# Patient Record
Sex: FEMALE
[De-identification: names, ages, dates, MRNs, and addresses within clinical notes are randomized per-mention and may not be internally consistent; named-entity substitution may affect disease eponyms.]

---

## 2021-07-08 PROBLEM — Z00.00 ENCOUNTER FOR PREVENTIVE HEALTH EXAMINATION: Status: ACTIVE | Noted: 2021-07-08

## 2021-07-13 ENCOUNTER — APPOINTMENT (OUTPATIENT)
Dept: PULMONOLOGY | Facility: CLINIC | Age: 46
End: 2021-07-13
Payer: COMMERCIAL

## 2021-07-13 ENCOUNTER — TRANSCRIPTION ENCOUNTER (OUTPATIENT)
Age: 46
End: 2021-07-13

## 2021-07-13 VITALS
DIASTOLIC BLOOD PRESSURE: 66 MMHG | OXYGEN SATURATION: 96 % | WEIGHT: 225 LBS | HEART RATE: 78 BPM | HEIGHT: 66 IN | RESPIRATION RATE: 15 BRPM | SYSTOLIC BLOOD PRESSURE: 104 MMHG | BODY MASS INDEX: 36.16 KG/M2 | TEMPERATURE: 98 F

## 2021-07-13 PROCEDURE — 99214 OFFICE O/P EST MOD 30 MIN: CPT | Mod: 25

## 2021-07-13 PROCEDURE — 99072 ADDL SUPL MATRL&STAF TM PHE: CPT

## 2021-07-13 PROCEDURE — 94729 DIFFUSING CAPACITY: CPT

## 2021-07-13 PROCEDURE — 94726 PLETHYSMOGRAPHY LUNG VOLUMES: CPT

## 2021-07-13 PROCEDURE — 94060 EVALUATION OF WHEEZING: CPT

## 2021-07-13 RX ORDER — ATORVASTATIN CALCIUM 20 MG/1
20 TABLET, FILM COATED ORAL
Qty: 30 | Refills: 0 | Status: ACTIVE | COMMUNITY
Start: 2021-06-24

## 2021-07-13 RX ORDER — AMOXICILLIN 500 MG/1
500 CAPSULE ORAL
Qty: 21 | Refills: 0 | Status: ACTIVE | COMMUNITY
Start: 2021-03-17

## 2021-07-13 RX ORDER — LEVOTHYROXINE SODIUM 25 UG/1
25 CAPSULE ORAL
Qty: 30 | Refills: 0 | Status: ACTIVE | COMMUNITY
Start: 2021-06-24

## 2021-07-13 RX ORDER — LEVOFLOXACIN 500 MG/1
500 TABLET, FILM COATED ORAL
Qty: 7 | Refills: 0 | Status: ACTIVE | COMMUNITY
Start: 2021-07-03

## 2021-07-13 NOTE — REASON FOR VISIT
[Follow-Up - From Hospitalization] : a follow-up visit after a recent hospitalization [TextBox_44] : Pleural  effusion

## 2021-07-13 NOTE — ASSESSMENT
[FreeTextEntry1] : In summary the patient is a 46-year-old obese female who is status post thoracentesis of right pleural effusion and who presents today for follow-up.  Patient's physical exam is significant for improved air entry in the right lung.  I reviewed the patient's pathology with her.  I recommend rheumatological evaluation in view of her positive DEREK in speckled pattern.  The patient underwent a pulmonary function test which revealed restrictive lung disease but excellent response post bronchodilator therapy.  The patient is now started on Trelegy.  A home sleep monitor has been ordered and the patient is instructed to follow-up in 2 weeks

## 2021-07-13 NOTE — HISTORY OF PRESENT ILLNESS
[Never] : never [TextBox_4] : Patient is a 46-year-old morbidly obese female who originally was admitted to the hospital with a right-sided pleural effusion and underwent thoracentesis.  Approximately 1 L of serous fluid was removed which was transudate of in nature.  The patient's rheumatology panel came back DEREK positive speckled pattern positive\par Patient currently denies fevers chills chest pain weight loss or hemoptysis.  She feels much better since her thoracentesis she does complain of occasional nonrestorative sleep

## 2021-07-21 ENCOUNTER — APPOINTMENT (OUTPATIENT)
Dept: PULMONOLOGY | Facility: CLINIC | Age: 46
End: 2021-07-21

## 2021-08-03 ENCOUNTER — APPOINTMENT (OUTPATIENT)
Dept: PULMONOLOGY | Facility: CLINIC | Age: 46
End: 2021-08-03
Payer: COMMERCIAL

## 2021-08-03 VITALS
WEIGHT: 223 LBS | TEMPERATURE: 98.3 F | BODY MASS INDEX: 35.84 KG/M2 | HEIGHT: 66 IN | OXYGEN SATURATION: 97 % | RESPIRATION RATE: 14 BRPM | HEART RATE: 90 BPM | DIASTOLIC BLOOD PRESSURE: 69 MMHG | SYSTOLIC BLOOD PRESSURE: 117 MMHG

## 2021-08-03 PROCEDURE — 99214 OFFICE O/P EST MOD 30 MIN: CPT

## 2021-08-03 NOTE — ASSESSMENT
[FreeTextEntry1] : In summary patient is a 46-year-old obese female with past medical history for possible rheumatoid arthritis and pleural effusion who presents for follow-up.  Patient's physical exam is within normal limits.  A CT of the chest has been ordered as well as an auto CPAP and the patient is instructed to follow-up in 3 months

## 2021-08-03 NOTE — HISTORY OF PRESENT ILLNESS
[Never] : never [TextBox_4] : Patient is a 46-year-old obese female with past medical history significant for high cholesterol who recently underwent a right thoracentesis and is currently being worked up for possible rheumatoid arthritis.  The patient also underwent a home sleep monitor which revealed moderate obstructive sleep apnea.  She presents today for further management

## 2021-10-19 ENCOUNTER — APPOINTMENT (OUTPATIENT)
Dept: PULMONOLOGY | Facility: CLINIC | Age: 46
End: 2021-10-19
Payer: COMMERCIAL

## 2021-10-19 VITALS
HEART RATE: 91 BPM | DIASTOLIC BLOOD PRESSURE: 81 MMHG | OXYGEN SATURATION: 96 % | RESPIRATION RATE: 14 BRPM | SYSTOLIC BLOOD PRESSURE: 138 MMHG | TEMPERATURE: 98.1 F

## 2021-10-19 PROCEDURE — 99214 OFFICE O/P EST MOD 30 MIN: CPT

## 2021-10-20 NOTE — PHYSICAL EXAM
[No Acute Distress] : no acute distress [Normal Oropharynx] : normal oropharynx [III] : Mallampati Class: III [Normal Appearance] : normal appearance [No Neck Mass] : no neck mass [Normal Rate/Rhythm] : normal rate/rhythm [Normal S1, S2] : normal s1, s2 [No Murmurs] : no murmurs [No Resp Distress] : no resp distress [No Abnormalities] : no abnormalities [Benign] : benign [Normal Gait] : normal gait [No Clubbing] : no clubbing [No Cyanosis] : no cyanosis [No Edema] : no edema [FROM] : FROM [Normal Color/ Pigmentation] : normal color/ pigmentation [No Focal Deficits] : no focal deficits [Oriented x3] : oriented x3 [Normal Affect] : normal affect [TextBox_68] : Decreased breath sounds right lower lobe

## 2021-10-20 NOTE — HISTORY OF PRESENT ILLNESS
[Never] : never [TextBox_4] : Patient is a 46-year-old obese female with past medical history significant for positive DEREK, moderate obstructive sleep apnea status post right thoracentesis with transudate effusion on July 4, 2021 who presents today for follow-up.  The patient had a repeat CT chest which revealed persistent pleural effusion despite thoracentesis.  Patient complains of occasional cough and shortness of breath.  Of note the patient's brother also has a history of pleural effusion status post VATS and pleural biopsy.  The patient is concerned regarding the persistence of her effusion and presents for further management.  She currently denies fevers chills chest pain weight loss or hemoptysis

## 2021-10-20 NOTE — ASSESSMENT
[FreeTextEntry1] : Summary the patient is a 46-year-old obese female with past medical history significant for obstructive sleep apnea currently not on CPAP, high cholesterol, DEREK positivity who presents today for evaluation of recurrent pleural effusion.  The patient's physical exam is significant for decreased breath sounds right lower lobe.  I reviewed the CAT scan of the chest with the patient.  We discussed options for further diagnosis and treatment.  The patient is in agreement to be seen by thoracic surgery for VATS and pleural biopsy.  The patient is now referred to Dr. Sears for further evaluation

## 2021-10-20 NOTE — REASON FOR VISIT
[Follow-Up] : a follow-up visit [Sleep Apnea] : sleep apnea [Shortness of Breath] : shortness of breath [TextBox_44] : Pleural effusion

## 2021-10-28 ENCOUNTER — APPOINTMENT (OUTPATIENT)
Dept: THORACIC SURGERY | Facility: CLINIC | Age: 46
End: 2021-10-28
Payer: COMMERCIAL

## 2021-10-28 VITALS
SYSTOLIC BLOOD PRESSURE: 116 MMHG | TEMPERATURE: 96.9 F | HEIGHT: 66 IN | OXYGEN SATURATION: 97 % | RESPIRATION RATE: 16 BRPM | WEIGHT: 230 LBS | DIASTOLIC BLOOD PRESSURE: 60 MMHG | BODY MASS INDEX: 36.96 KG/M2 | HEART RATE: 67 BPM

## 2021-10-28 DIAGNOSIS — R06.02 SHORTNESS OF BREATH: ICD-10-CM

## 2021-10-28 PROCEDURE — 99244 OFF/OP CNSLTJ NEW/EST MOD 40: CPT

## 2021-10-28 NOTE — HISTORY OF PRESENT ILLNESS
[FreeTextEntry1] : 47 y/o female, former smoker (quit 7/2021) with a PMH of hypothyroidism, HLD, positive DEERK, CHEVY,  with complaints of right neck pain and and subsequent CT chest revealing right pleural effusion, s/p right thoracentesis with transudate effusion on 7/4/2021 ( [facility]). Patient presents today for further diagnosis and treatment. Patient is referred by Dr. Wells. \par \par CT chest completed on 10/01/21:\par -slightly decreased right pleural effusion\par -improving right middle lobe and lower lobe atelectasis\par

## 2021-10-28 NOTE — ASSESSMENT
[FreeTextEntry1] : 45 y/o female, former smoker (quit 7/2021) with a PMH of hypothyroidism, HLD, positive DEREK, CHEVY,  with complaints of right neck pain and and subsequent CT chest revealing right pleural effusion, s/p right thoracentesis with transudate effusion on 7/4/2021 ( [facility]). Patient presents today for further diagnosis and treatment. Patient is referred by Dr. Wells. \par \par Today the patient reports SOB when taking a deep breath. She denies cough, fever and hemoptysis. \par \par CT chest completed on 10/01/21: was reviewed which revealed:\par -slightly decreased right pleural effusion\par -improving right middle lobe and lower lobe atelectasis\par \par Explained that we must determine the cause of the effusion. I recommend a Bronchoscopy, RIGHT Vats, robotic assisted, drainage of pleural effusion, possible decortication to determine a definitive diagnosis. WIll perform the decortication if the lung does not fully expand intraoperatively. Explained that this would be therapeutic and prevent the fluid from reaccumulation. Discussed that this could be related to infection vs. inflammation vs. malignancy.  \par \par She will need medical clearance, PST labs, UA EKG.\par \par The patient was counseled on the risks, benefits and alternatives of proceeding with drainage of pleural. effusion/ decortication. Specifically, the risk of bleeding, need for a blood transfusion, DVT, pulmonary embolism, pneumonia, postoperative respiratory insufficiency, postoperative ventilator support, as well as prolonged air leak, need for chest drainage, dysrhythmia, myocardial infarction, postoperative pain syndrome, need for adjuvant therapy, risk of recurrence, need for surveillance, conversion to an open procedure, as well as mortality was discussed with the patient who understands and agrees to the above. \par \par PLAN:\par 1. Bronchoscopy, RIGHT Vats, robotic assisted, drainage of pleural effusion, possible decortication on 11/10/21\par 2. medical clearance, PST labs, UA EKG.\par \par I, Patrick Sears, have reviewed the documentation recorded by the scribe in my presence and it accurately and completely records my words and actions.\par \par Recurrent symptomatic right pleural effusion referred for diagnosis and therapy. We discussed the possibility of need for decortication as well. Risks, benefits and alternatives were explained to the patient, and her son who was present at the patient's request. All questions were answered to the patient's satisfaction.\par

## 2021-10-28 NOTE — PHYSICAL EXAM
[General Appearance - Alert] : alert [General Appearance - In No Acute Distress] : in no acute distress [General Appearance - Well Nourished] : well nourished [Neck Appearance] : the appearance of the neck was normal [Neck Cervical Mass (___cm)] : no neck mass was observed [] : no respiratory distress [Respiration, Rhythm And Depth] : normal respiratory rhythm and effort [Exaggerated Use Of Accessory Muscles For Inspiration] : no accessory muscle use [Examination Of The Chest] : the chest was normal in appearance [Oriented To Time, Place, And Person] : oriented to person, place, and time [Impaired Insight] : insight and judgment were intact [Affect] : the affect was normal

## 2021-11-07 ENCOUNTER — LABORATORY RESULT (OUTPATIENT)
Age: 46
End: 2021-11-07

## 2021-11-07 ENCOUNTER — APPOINTMENT (OUTPATIENT)
Dept: DISASTER EMERGENCY | Facility: CLINIC | Age: 46
End: 2021-11-07

## 2021-11-07 DIAGNOSIS — Z01.818 ENCOUNTER FOR OTHER PREPROCEDURAL EXAMINATION: ICD-10-CM

## 2021-11-09 ENCOUNTER — NON-APPOINTMENT (OUTPATIENT)
Age: 46
End: 2021-11-09

## 2021-11-09 ENCOUNTER — TRANSCRIPTION ENCOUNTER (OUTPATIENT)
Age: 46
End: 2021-11-09

## 2021-11-09 VITALS
HEART RATE: 67 BPM | TEMPERATURE: 97 F | SYSTOLIC BLOOD PRESSURE: 116 MMHG | RESPIRATION RATE: 18 BRPM | DIASTOLIC BLOOD PRESSURE: 60 MMHG | OXYGEN SATURATION: 97 % | HEIGHT: 66 IN | WEIGHT: 229.94 LBS

## 2021-11-09 RX ORDER — INFLUENZA VIRUS VACCINE 15; 15; 15; 15 UG/.5ML; UG/.5ML; UG/.5ML; UG/.5ML
0.5 SUSPENSION INTRAMUSCULAR ONCE
Refills: 0 | Status: COMPLETED | OUTPATIENT
Start: 2021-11-10 | End: 2021-11-10

## 2021-11-09 NOTE — H&P ADULT - REASON FOR ADMISSION
Elective: Bronchoscopy, RIGHT VATS robotic assisted drainage of pleural effusion, possible decoration

## 2021-11-09 NOTE — H&P ADULT - NSHPSOCIALHISTORY_GEN_ALL_CORE
Denies smoking, alcohol or illicit drug use. Denies alcohol or illicit drug use.  Former smoker, quit in July, 1 pack a week x 20 years

## 2021-11-09 NOTE — H&P ADULT - HISTORY OF PRESENT ILLNESS
45 y/o female, former smoker (quit 7/2021) with a PMH of hypothyroidism, HLD, positive DEREK, CHEVY, with complaints of right neck pain and and subsequent CT chest revealing right pleural effusion, s/p right thoracentesis with transudate effusion on 7/4/2021 ( [facility]). Patient presents today for further diagnosis and treatment. Patient is referred by Dr. Wells.     CT chest completed on 10/01/21:  -slightly decreased right pleural effusion  -improving right middle lobe and lower lobe atelectasis   47 y/o female, former smoker (quit 7/2021) with a PMH of hypothyroidism, HLD, positive DEREK, CHEVY, with complaints of right neck pain and and subsequent CT chest revealing right pleural effusion, s/p right thoracentesis with transudate effusion on 7/4/2021 ( [facility]). Patient presents today for further diagnosis and treatment. Patient is referred by Dr. Wells.     CT chest completed on 10/01/21:  -slightly decreased right pleural effusion  -improving right middle lobe and lower lobe atelectasis    She presents today for R VATS RA drainage of pleural effusion and possible decortication. Patient seen in same day holding area; Reports no changes to PMHx or medications since last seen by our team. Denies acute or current SOB, chest pain, palpitation, N/V/D, fever/chills, recent illness, or any other concerning symptoms.

## 2021-11-09 NOTE — H&P ADULT - NSHPLABSRESULTS_GEN_ALL_CORE
CT chest completed on 10/01/21:  -slightly decreased right pleural effusion  -improving right middle lobe and lower lobe atelectasis

## 2021-11-09 NOTE — H&P ADULT - ASSESSMENT
47 y/o female, former smoker (quit 7/2021) with a PMH of hypothyroidism, HLD, positive DEREK, CHEVY, with complaints of right neck pain and and subsequent CT chest revealing right pleural effusion, s/p right thoracentesis with transudate effusion on 7/4/2021 ( [facility]). Patient presents today for further diagnosis and treatment. Patient is referred by Dr. Wells.     Today the patient reports SOB when taking a deep breath. She denies cough, fever and hemoptysis.     CT chest completed on 10/01/21: was reviewed which revealed:  -slightly decreased right pleural effusion  -improving right middle lobe and lower lobe atelectasis    Explained that we must determine the cause of the effusion. I recommend a Bronchoscopy, RIGHT Vats, robotic assisted, drainage of pleural effusion, possible decortication to determine a definitive diagnosis. WIll perform the decortication if the lung does not fully expand intraoperatively. Explained that this would be therapeutic and prevent the fluid from reaccumulation. Discussed that this could be related to infection vs. inflammation vs. malignancy.     She will need medical clearance, PST labs, UA EKG.    The patient was counseled on the risks, benefits and alternatives of proceeding with drainage of pleural. effusion/ decortication. Specifically, the risk of bleeding, need for a blood transfusion, DVT, pulmonary embolism, pneumonia, postoperative respiratory insufficiency, postoperative ventilator support, as well as prolonged air leak, need for chest drainage, dysrhythmia, myocardial infarction, postoperative pain syndrome, need for adjuvant therapy, risk of recurrence, need for surveillance, conversion to an open procedure, as well as mortality was discussed with the patient who understands and agrees to the above.     PLAN:  1. Bronchoscopy, RIGHT Vats, robotic assisted, drainage of pleural effusion, possible decortication on 11/10/21  2. Medical clearance, PST labs, UA EKG.   45 y/o female, former smoker (quit 7/2021) with a PMH of hypothyroidism, HLD, positive DEREK, CHEVY, with complaints of right neck pain and and subsequent CT chest revealing right pleural effusion, s/p right thoracentesis with transudate effusion on 7/4/2021 ( [facility]). Patient presents today for further diagnosis and treatment. Patient is referred by Dr. Wells.     Today the patient reports SOB when taking a deep breath. She denies cough, fever and hemoptysis.     CT chest completed on 10/01/21: was reviewed which revealed:  -slightly decreased right pleural effusion  -improving right middle lobe and lower lobe atelectasis    Explained that we must determine the cause of the effusion. I recommend a Bronchoscopy, RIGHT Vats, robotic assisted, drainage of pleural effusion, possible decortication to determine a definitive diagnosis. WIll perform the decortication if the lung does not fully expand intraoperatively. Explained that this would be therapeutic and prevent the fluid from reaccumulation. Discussed that this could be related to infection vs. inflammation vs. malignancy.     She will need medical clearance, PST labs, UA EKG.    The patient was counseled on the risks, benefits and alternatives of proceeding with drainage of pleural. effusion/ decortication. Specifically, the risk of bleeding, need for a blood transfusion, DVT, pulmonary embolism, pneumonia, postoperative respiratory insufficiency, postoperative ventilator support, as well as prolonged air leak, need for chest drainage, dysrhythmia, myocardial infarction, postoperative pain syndrome, need for adjuvant therapy, risk of recurrence, need for surveillance, conversion to an open procedure, as well as mortality was discussed with the patient who understands and agrees to the above.     PLAN:  1. Bronchoscopy, RIGHT Vats, robotic assisted, drainage of pleural effusion, possible decortication on 11/10/21  2. Medical clearance, PST labs, UA EKG.      Admit under Dr. Sears via same day surgery.   Consent signed, placed on chart.  Risks/benefits reviewed, patient understands and agrees.   T&S ordered and blood products placed on hold for OR.    To [facility]  post-op.

## 2021-11-09 NOTE — H&P ADULT - BMI (KG/M2)
37.1 MD acknowledged the results and states that she will come to assess the patient will reassess zoran zheng Provider notified and aware. hold pm dosage. do another vanco trough before 6am dose of vanco Pending orders Will assess patient during morning rounds, hold vancomycin until dosage review

## 2021-11-10 ENCOUNTER — INPATIENT (INPATIENT)
Facility: HOSPITAL | Age: 46
LOS: 2 days | Discharge: HOME | DRG: 164 | End: 2021-11-13
Attending: SPECIALIST | Admitting: SPECIALIST
Payer: COMMERCIAL

## 2021-11-10 ENCOUNTER — RESULT REVIEW (OUTPATIENT)
Age: 46
End: 2021-11-10

## 2021-11-10 ENCOUNTER — APPOINTMENT (OUTPATIENT)
Dept: THORACIC SURGERY | Facility: HOSPITAL | Age: 46
End: 2021-11-10

## 2021-11-10 DIAGNOSIS — Z90.89 ACQUIRED ABSENCE OF OTHER ORGANS: Chronic | ICD-10-CM

## 2021-11-10 DIAGNOSIS — Z98.890 OTHER SPECIFIED POSTPROCEDURAL STATES: Chronic | ICD-10-CM

## 2021-11-10 DIAGNOSIS — Z90.49 ACQUIRED ABSENCE OF OTHER SPECIFIED PARTS OF DIGESTIVE TRACT: Chronic | ICD-10-CM

## 2021-11-10 DIAGNOSIS — M51.26 OTHER INTERVERTEBRAL DISC DISPLACEMENT, LUMBAR REGION: Chronic | ICD-10-CM

## 2021-11-10 LAB
ANION GAP SERPL CALC-SCNC: 12 MMOL/L — SIGNIFICANT CHANGE UP (ref 5–17)
B PERT IGG+IGM PNL SER: SIGNIFICANT CHANGE UP
BASOPHILS # BLD AUTO: 0.06 K/UL — SIGNIFICANT CHANGE UP (ref 0–0.2)
BASOPHILS NFR BLD AUTO: 0.5 % — SIGNIFICANT CHANGE UP (ref 0–2)
BLD GP AB SCN SERPL QL: NEGATIVE — SIGNIFICANT CHANGE UP
BUN SERPL-MCNC: 13 MG/DL — SIGNIFICANT CHANGE UP (ref 7–23)
CALCIUM SERPL-MCNC: 9.1 MG/DL — SIGNIFICANT CHANGE UP (ref 8.4–10.5)
CHLORIDE SERPL-SCNC: 104 MMOL/L — SIGNIFICANT CHANGE UP (ref 96–108)
CO2 SERPL-SCNC: 23 MMOL/L — SIGNIFICANT CHANGE UP (ref 22–31)
COLOR FLD: YELLOW — SIGNIFICANT CHANGE UP
CREAT SERPL-MCNC: 0.74 MG/DL — SIGNIFICANT CHANGE UP (ref 0.5–1.3)
EOSINOPHIL # BLD AUTO: 0.02 K/UL — SIGNIFICANT CHANGE UP (ref 0–0.5)
EOSINOPHIL NFR BLD AUTO: 0.2 % — SIGNIFICANT CHANGE UP (ref 0–6)
FLUID INTAKE SUBSTANCE CLASS: SIGNIFICANT CHANGE UP
GLUCOSE BLDC GLUCOMTR-MCNC: 114 MG/DL — HIGH (ref 70–99)
GLUCOSE BLDC GLUCOMTR-MCNC: 144 MG/DL — HIGH (ref 70–99)
GLUCOSE FLD-MCNC: 96 MG/DL — SIGNIFICANT CHANGE UP
GLUCOSE SERPL-MCNC: 125 MG/DL — HIGH (ref 70–99)
GRAM STN FLD: SIGNIFICANT CHANGE UP
GRAM STN FLD: SIGNIFICANT CHANGE UP
HCT VFR BLD CALC: 40.7 % — SIGNIFICANT CHANGE UP (ref 34.5–45)
HGB BLD-MCNC: 13.9 G/DL — SIGNIFICANT CHANGE UP (ref 11.5–15.5)
IMM GRANULOCYTES NFR BLD AUTO: 0.6 % — SIGNIFICANT CHANGE UP (ref 0–1.5)
LDH SERPL L TO P-CCNC: 174 U/L — SIGNIFICANT CHANGE UP
LYMPHOCYTES # BLD AUTO: 1.01 K/UL — SIGNIFICANT CHANGE UP (ref 1–3.3)
LYMPHOCYTES # BLD AUTO: 7.7 % — LOW (ref 13–44)
MCHC RBC-ENTMCNC: 29.4 PG — SIGNIFICANT CHANGE UP (ref 27–34)
MCHC RBC-ENTMCNC: 34.2 GM/DL — SIGNIFICANT CHANGE UP (ref 32–36)
MCV RBC AUTO: 86 FL — SIGNIFICANT CHANGE UP (ref 80–100)
MONOCYTES # BLD AUTO: 0.34 K/UL — SIGNIFICANT CHANGE UP (ref 0–0.9)
MONOCYTES NFR BLD AUTO: 2.6 % — SIGNIFICANT CHANGE UP (ref 2–14)
NEUTROPHILS # BLD AUTO: 11.57 K/UL — HIGH (ref 1.8–7.4)
NEUTROPHILS NFR BLD AUTO: 88.4 % — HIGH (ref 43–77)
NRBC # BLD: 0 /100 WBCS — SIGNIFICANT CHANGE UP (ref 0–0)
PLATELET # BLD AUTO: 277 K/UL — SIGNIFICANT CHANGE UP (ref 150–400)
POTASSIUM SERPL-MCNC: 4.4 MMOL/L — SIGNIFICANT CHANGE UP (ref 3.5–5.3)
POTASSIUM SERPL-SCNC: 4.4 MMOL/L — SIGNIFICANT CHANGE UP (ref 3.5–5.3)
PROT FLD-MCNC: 4.8 G/DL — SIGNIFICANT CHANGE UP
RBC # BLD: 4.73 M/UL — SIGNIFICANT CHANGE UP (ref 3.8–5.2)
RBC # FLD: 12.3 % — SIGNIFICANT CHANGE UP (ref 10.3–14.5)
RCV VOL RI: 1000 /UL — HIGH (ref 0–0)
RH IG SCN BLD-IMP: POSITIVE — SIGNIFICANT CHANGE UP
SODIUM SERPL-SCNC: 139 MMOL/L — SIGNIFICANT CHANGE UP (ref 135–145)
SPECIMEN SOURCE FLD: SIGNIFICANT CHANGE UP
SPECIMEN SOURCE: SIGNIFICANT CHANGE UP
SPECIMEN SOURCE: SIGNIFICANT CHANGE UP
TOTAL NUCLEATED CELL COUNT, BODY FLUID: 1236 /UL — SIGNIFICANT CHANGE UP
TUBE TYPE: SIGNIFICANT CHANGE UP
WBC # BLD: 13.08 K/UL — HIGH (ref 3.8–10.5)
WBC # FLD AUTO: 13.08 K/UL — HIGH (ref 3.8–10.5)

## 2021-11-10 PROCEDURE — 88305 TISSUE EXAM BY PATHOLOGIST: CPT | Mod: 26

## 2021-11-10 PROCEDURE — 71045 X-RAY EXAM CHEST 1 VIEW: CPT | Mod: 26

## 2021-11-10 PROCEDURE — 32652 THORACOSCOPY REM TOTL CORTEX: CPT | Mod: 80

## 2021-11-10 PROCEDURE — 32652 THORACOSCOPY REM TOTL CORTEX: CPT

## 2021-11-10 PROCEDURE — 88112 CYTOPATH CELL ENHANCE TECH: CPT | Mod: 26

## 2021-11-10 PROCEDURE — 31622 DX BRONCHOSCOPE/WASH: CPT

## 2021-11-10 PROCEDURE — S2900 ROBOTIC SURGICAL SYSTEM: CPT | Mod: NC

## 2021-11-10 RX ORDER — ATORVASTATIN CALCIUM 80 MG/1
1 TABLET, FILM COATED ORAL
Qty: 0 | Refills: 0 | DISCHARGE

## 2021-11-10 RX ORDER — DEXTROSE 50 % IN WATER 50 %
15 SYRINGE (ML) INTRAVENOUS ONCE
Refills: 0 | Status: DISCONTINUED | OUTPATIENT
Start: 2021-11-10 | End: 2021-11-11

## 2021-11-10 RX ORDER — INSULIN LISPRO 100/ML
VIAL (ML) SUBCUTANEOUS
Refills: 0 | Status: DISCONTINUED | OUTPATIENT
Start: 2021-11-10 | End: 2021-11-11

## 2021-11-10 RX ORDER — DEXTROSE 50 % IN WATER 50 %
25 SYRINGE (ML) INTRAVENOUS ONCE
Refills: 0 | Status: DISCONTINUED | OUTPATIENT
Start: 2021-11-10 | End: 2021-11-11

## 2021-11-10 RX ORDER — FAMOTIDINE 10 MG/ML
20 INJECTION INTRAVENOUS DAILY
Refills: 0 | Status: DISCONTINUED | OUTPATIENT
Start: 2021-11-10 | End: 2021-11-11

## 2021-11-10 RX ORDER — BUPIVACAINE 13.3 MG/ML
20 INJECTION, SUSPENSION, LIPOSOMAL INFILTRATION ONCE
Refills: 0 | Status: DISCONTINUED | OUTPATIENT
Start: 2021-11-10 | End: 2021-11-11

## 2021-11-10 RX ORDER — DEXTROSE 50 % IN WATER 50 %
12.5 SYRINGE (ML) INTRAVENOUS ONCE
Refills: 0 | Status: DISCONTINUED | OUTPATIENT
Start: 2021-11-10 | End: 2021-11-11

## 2021-11-10 RX ORDER — ACETAMINOPHEN 500 MG
1000 TABLET ORAL ONCE
Refills: 0 | Status: COMPLETED | OUTPATIENT
Start: 2021-11-10 | End: 2021-11-10

## 2021-11-10 RX ORDER — SODIUM CHLORIDE 9 MG/ML
1000 INJECTION, SOLUTION INTRAVENOUS
Refills: 0 | Status: DISCONTINUED | OUTPATIENT
Start: 2021-11-10 | End: 2021-11-11

## 2021-11-10 RX ORDER — GLUCAGON INJECTION, SOLUTION 0.5 MG/.1ML
1 INJECTION, SOLUTION SUBCUTANEOUS ONCE
Refills: 0 | Status: DISCONTINUED | OUTPATIENT
Start: 2021-11-10 | End: 2021-11-11

## 2021-11-10 RX ORDER — CEFAZOLIN SODIUM 1 G
2000 VIAL (EA) INJECTION EVERY 8 HOURS
Refills: 0 | Status: COMPLETED | OUTPATIENT
Start: 2021-11-10 | End: 2021-11-11

## 2021-11-10 RX ORDER — LEVOTHYROXINE SODIUM 125 MCG
25 TABLET ORAL DAILY
Refills: 0 | Status: DISCONTINUED | OUTPATIENT
Start: 2021-11-10 | End: 2021-11-13

## 2021-11-10 RX ORDER — LEVOTHYROXINE SODIUM 125 MCG
1 TABLET ORAL
Qty: 0 | Refills: 0 | DISCHARGE

## 2021-11-10 RX ORDER — ATORVASTATIN CALCIUM 80 MG/1
20 TABLET, FILM COATED ORAL AT BEDTIME
Refills: 0 | Status: DISCONTINUED | OUTPATIENT
Start: 2021-11-10 | End: 2021-11-13

## 2021-11-10 RX ORDER — MORPHINE SULFATE 50 MG/1
2 CAPSULE, EXTENDED RELEASE ORAL ONCE
Refills: 0 | Status: DISCONTINUED | OUTPATIENT
Start: 2021-11-10 | End: 2021-11-10

## 2021-11-10 RX ORDER — LIDOCAINE 4 G/100G
1 CREAM TOPICAL DAILY
Refills: 0 | Status: DISCONTINUED | OUTPATIENT
Start: 2021-11-10 | End: 2021-11-13

## 2021-11-10 RX ORDER — HEPARIN SODIUM 5000 [USP'U]/ML
7500 INJECTION INTRAVENOUS; SUBCUTANEOUS EVERY 8 HOURS
Refills: 0 | Status: DISCONTINUED | OUTPATIENT
Start: 2021-11-10 | End: 2021-11-13

## 2021-11-10 RX ADMIN — Medication 1000 MILLIGRAM(S): at 22:00

## 2021-11-10 RX ADMIN — MORPHINE SULFATE 2 MILLIGRAM(S): 50 CAPSULE, EXTENDED RELEASE ORAL at 19:00

## 2021-11-10 RX ADMIN — Medication 100 MILLIGRAM(S): at 21:19

## 2021-11-10 RX ADMIN — MORPHINE SULFATE 2 MILLIGRAM(S): 50 CAPSULE, EXTENDED RELEASE ORAL at 19:59

## 2021-11-10 RX ADMIN — LIDOCAINE 1 PATCH: 4 CREAM TOPICAL at 22:00

## 2021-11-10 RX ADMIN — ATORVASTATIN CALCIUM 20 MILLIGRAM(S): 80 TABLET, FILM COATED ORAL at 22:02

## 2021-11-10 RX ADMIN — HEPARIN SODIUM 7500 UNIT(S): 5000 INJECTION INTRAVENOUS; SUBCUTANEOUS at 22:56

## 2021-11-10 RX ADMIN — Medication 400 MILLIGRAM(S): at 21:18

## 2021-11-10 NOTE — BRIEF OPERATIVE NOTE - NSICDXBRIEFPOSTOP_GEN_ALL_CORE_FT
POST-OP DIAGNOSIS:  Pleural effusion, right 10-Nov-2021 16:08:26  Nneka Salomon  Trapped lung 10-Nov-2021 16:08:33  Nneka Salomon

## 2021-11-10 NOTE — BRIEF OPERATIVE NOTE - COMMENTS
Dr. King was the first assistant for this case including but not limited to right pleurodesis and evacuation of right pleural effusion     I was present for this procedure and participated as first assistant as described by the PA above, unless otherwise noted below.

## 2021-11-10 NOTE — PROGRESS NOTE ADULT - SUBJECTIVE AND OBJECTIVE BOX
Patient discussed on morning rounds with Dr. Sears    Operation / Date: 11/10/21 right VATs with total decortication and evacuation of pleural effusion    SUBJECTIVE ASSESSMENT:  Feeling well, pain well controlled. No complaints in PACU, denies n/v/d/c, fevers or chills.     Vital Signs Last 24 Hrs  T(C): --  T(F): --  HR: 88 (10 Nov 2021 16:10) (88 - 93)  BP: 130/61 (10 Nov 2021 16:10) (117/56 - 130/61)  BP(mean): 88 (10 Nov 2021 16:10) (81 - 88)  RR: 14 (10 Nov 2021 16:10) (12 - 17)  SpO2: 99% (10 Nov 2021 16:10) (98% - 99%)  I&O's Detail    CHEST TUBE: yes x2 -- on suction, no airleak, minimal output  REBECCA DRAIN:  no  EPICARDIAL WIRES: no  TIE DOWNS: no.  ESPINO: no    PHYSICAL EXAM:  General: well appearing sitting in bed in NAD   Neurological: AOx3. Motor skills grossly intact  Cardiovascular: Normal S1/S2. Regular rate/rhythm. No murmurs  Respiratory: Lungs CTA bilaterally. No wheezing or rales  Gastrointestinal: +BS in all 4 quadrants. Non-distended. Soft. Non-tender  Extremities: Strength 5/5 b/l upper/lower extremities. Sensation grossly intact upper/lower extremities. No edema. No calf tenderness.  Vascular: Radial 2+bilaterally, DP 2+ b/l  Incision Sites: VATS incisions without erythema, purulence or ecchymosis. CT in place with dressing c/d/i.     LABS:    COUMADIN: no    MEDICATIONS  (STANDING):  acetaminophen   IVPB .. 1000 milliGRAM(s) IV Intermittent once  atorvastatin 20 milliGRAM(s) Oral at bedtime  BUpivacaine liposome 1.3% Injectable (no eMAR) 20 milliLiter(s) Local Injection once  ceFAZolin   IVPB 2000 milliGRAM(s) IV Intermittent every 8 hours  famotidine Injectable 20 milliGRAM(s) IV Push daily  glucagon  Injectable 1 milliGRAM(s) IntraMuscular once  heparin   Injectable 5000 Unit(s) SubCutaneous every 8 hours  influenza   Vaccine 0.5 milliLiter(s) IntraMuscular once  insulin lispro (ADMELOG) corrective regimen sliding scale   SubCutaneous three times a day before meals  lactated ringers. 1000 milliLiter(s) (50 mL/Hr) IV Continuous <Continuous>  levothyroxine 25 MICROGram(s) Oral daily  lidocaine   4% Patch 1 Patch Transdermal daily    MEDICATIONS  (PRN):    RADIOLOGY & ADDITIONAL TESTS:  Post-op CXR: no acute pathology, no PTX, f/u official read

## 2021-11-11 LAB
A1C WITH ESTIMATED AVERAGE GLUCOSE RESULT: 5.3 % — SIGNIFICANT CHANGE UP (ref 4–5.6)
ANION GAP SERPL CALC-SCNC: 13 MMOL/L — SIGNIFICANT CHANGE UP (ref 5–17)
APTT BLD: 28.2 SEC — SIGNIFICANT CHANGE UP (ref 27.5–35.5)
BASOPHILS # BLD AUTO: 0.03 K/UL — SIGNIFICANT CHANGE UP (ref 0–0.2)
BASOPHILS NFR BLD AUTO: 0.3 % — SIGNIFICANT CHANGE UP (ref 0–2)
BUN SERPL-MCNC: 10 MG/DL — SIGNIFICANT CHANGE UP (ref 7–23)
CALCIUM SERPL-MCNC: 9.4 MG/DL — SIGNIFICANT CHANGE UP (ref 8.4–10.5)
CHLORIDE SERPL-SCNC: 102 MMOL/L — SIGNIFICANT CHANGE UP (ref 96–108)
CO2 SERPL-SCNC: 26 MMOL/L — SIGNIFICANT CHANGE UP (ref 22–31)
COMMENT - FLUIDS: SIGNIFICANT CHANGE UP
CREAT SERPL-MCNC: 0.7 MG/DL — SIGNIFICANT CHANGE UP (ref 0.5–1.3)
EOSINOPHIL # BLD AUTO: 0 K/UL — SIGNIFICANT CHANGE UP (ref 0–0.5)
EOSINOPHIL NFR BLD AUTO: 0 % — SIGNIFICANT CHANGE UP (ref 0–6)
ESTIMATED AVERAGE GLUCOSE: 105 MG/DL — SIGNIFICANT CHANGE UP (ref 68–114)
FLUID SEGMENTED GRANULOCYTES: 3 % — SIGNIFICANT CHANGE UP
GLUCOSE BLDC GLUCOMTR-MCNC: 108 MG/DL — HIGH (ref 70–99)
GLUCOSE BLDC GLUCOMTR-MCNC: 117 MG/DL — HIGH (ref 70–99)
GLUCOSE SERPL-MCNC: 104 MG/DL — HIGH (ref 70–99)
HCT VFR BLD CALC: 39.4 % — SIGNIFICANT CHANGE UP (ref 34.5–45)
HGB BLD-MCNC: 12.5 G/DL — SIGNIFICANT CHANGE UP (ref 11.5–15.5)
IMM GRANULOCYTES NFR BLD AUTO: 0.8 % — SIGNIFICANT CHANGE UP (ref 0–1.5)
INR BLD: 0.97 — SIGNIFICANT CHANGE UP (ref 0.88–1.16)
LYMPHOCYTES # BLD AUTO: 1.1 K/UL — SIGNIFICANT CHANGE UP (ref 1–3.3)
LYMPHOCYTES # BLD AUTO: 9.3 % — LOW (ref 13–44)
LYMPHOCYTES # FLD: 86 % — SIGNIFICANT CHANGE UP
MCHC RBC-ENTMCNC: 28.1 PG — SIGNIFICANT CHANGE UP (ref 27–34)
MCHC RBC-ENTMCNC: 31.7 GM/DL — LOW (ref 32–36)
MCV RBC AUTO: 88.5 FL — SIGNIFICANT CHANGE UP (ref 80–100)
MONOCYTES # BLD AUTO: 0.74 K/UL — SIGNIFICANT CHANGE UP (ref 0–0.9)
MONOCYTES NFR BLD AUTO: 6.3 % — SIGNIFICANT CHANGE UP (ref 2–14)
MONOS+MACROS # FLD: 11 % — SIGNIFICANT CHANGE UP
NEUTROPHILS # BLD AUTO: 9.84 K/UL — HIGH (ref 1.8–7.4)
NEUTROPHILS NFR BLD AUTO: 83.3 % — HIGH (ref 43–77)
NIGHT BLUE STAIN TISS: SIGNIFICANT CHANGE UP
NIGHT BLUE STAIN TISS: SIGNIFICANT CHANGE UP
NRBC # BLD: 0 /100 WBCS — SIGNIFICANT CHANGE UP (ref 0–0)
PH, PLEURAL FLUID: 7.15 — SIGNIFICANT CHANGE UP
PLATELET # BLD AUTO: 294 K/UL — SIGNIFICANT CHANGE UP (ref 150–400)
POTASSIUM SERPL-MCNC: 4.1 MMOL/L — SIGNIFICANT CHANGE UP (ref 3.5–5.3)
POTASSIUM SERPL-SCNC: 4.1 MMOL/L — SIGNIFICANT CHANGE UP (ref 3.5–5.3)
PROTHROM AB SERPL-ACNC: 11.7 SEC — SIGNIFICANT CHANGE UP (ref 10.6–13.6)
RBC # BLD: 4.45 M/UL — SIGNIFICANT CHANGE UP (ref 3.8–5.2)
RBC # FLD: 12.3 % — SIGNIFICANT CHANGE UP (ref 10.3–14.5)
SODIUM SERPL-SCNC: 141 MMOL/L — SIGNIFICANT CHANGE UP (ref 135–145)
SP GR UR: 1.03 — SIGNIFICANT CHANGE UP
SPECIMEN SOURCE FLD: SIGNIFICANT CHANGE UP
SPECIMEN SOURCE: SIGNIFICANT CHANGE UP
SPECIMEN SOURCE: SIGNIFICANT CHANGE UP
WBC # BLD: 11.8 K/UL — HIGH (ref 3.8–10.5)
WBC # FLD AUTO: 11.8 K/UL — HIGH (ref 3.8–10.5)

## 2021-11-11 PROCEDURE — 71045 X-RAY EXAM CHEST 1 VIEW: CPT | Mod: 26

## 2021-11-11 RX ORDER — TRAMADOL HYDROCHLORIDE 50 MG/1
25 TABLET ORAL ONCE
Refills: 0 | Status: DISCONTINUED | OUTPATIENT
Start: 2021-11-11 | End: 2021-11-11

## 2021-11-11 RX ORDER — ACETAMINOPHEN 500 MG
1000 TABLET ORAL ONCE
Refills: 0 | Status: COMPLETED | OUTPATIENT
Start: 2021-11-11 | End: 2021-11-11

## 2021-11-11 RX ORDER — FAMOTIDINE 10 MG/ML
20 INJECTION INTRAVENOUS DAILY
Refills: 0 | Status: DISCONTINUED | OUTPATIENT
Start: 2021-11-11 | End: 2021-11-13

## 2021-11-11 RX ORDER — SENNA PLUS 8.6 MG/1
2 TABLET ORAL AT BEDTIME
Refills: 0 | Status: DISCONTINUED | OUTPATIENT
Start: 2021-11-11 | End: 2021-11-13

## 2021-11-11 RX ADMIN — LIDOCAINE 1 PATCH: 4 CREAM TOPICAL at 12:31

## 2021-11-11 RX ADMIN — LIDOCAINE 1 PATCH: 4 CREAM TOPICAL at 10:51

## 2021-11-11 RX ADMIN — SENNA PLUS 2 TABLET(S): 8.6 TABLET ORAL at 21:46

## 2021-11-11 RX ADMIN — HEPARIN SODIUM 7500 UNIT(S): 5000 INJECTION INTRAVENOUS; SUBCUTANEOUS at 21:47

## 2021-11-11 RX ADMIN — ATORVASTATIN CALCIUM 20 MILLIGRAM(S): 80 TABLET, FILM COATED ORAL at 21:46

## 2021-11-11 RX ADMIN — HEPARIN SODIUM 7500 UNIT(S): 5000 INJECTION INTRAVENOUS; SUBCUTANEOUS at 14:54

## 2021-11-11 RX ADMIN — LIDOCAINE 1 PATCH: 4 CREAM TOPICAL at 18:37

## 2021-11-11 RX ADMIN — Medication 400 MILLIGRAM(S): at 15:07

## 2021-11-11 RX ADMIN — Medication 1000 MILLIGRAM(S): at 10:10

## 2021-11-11 RX ADMIN — FAMOTIDINE 20 MILLIGRAM(S): 10 INJECTION INTRAVENOUS at 12:31

## 2021-11-11 RX ADMIN — TRAMADOL HYDROCHLORIDE 25 MILLIGRAM(S): 50 TABLET ORAL at 11:50

## 2021-11-11 RX ADMIN — Medication 400 MILLIGRAM(S): at 09:32

## 2021-11-11 RX ADMIN — LIDOCAINE 1 PATCH: 4 CREAM TOPICAL at 06:10

## 2021-11-11 RX ADMIN — Medication 100 MILLIGRAM(S): at 05:56

## 2021-11-11 RX ADMIN — Medication 100 MILLIGRAM(S): at 12:32

## 2021-11-11 RX ADMIN — Medication 25 MICROGRAM(S): at 05:56

## 2021-11-11 RX ADMIN — TRAMADOL HYDROCHLORIDE 25 MILLIGRAM(S): 50 TABLET ORAL at 10:55

## 2021-11-11 RX ADMIN — Medication 1000 MILLIGRAM(S): at 15:50

## 2021-11-11 RX ADMIN — HEPARIN SODIUM 7500 UNIT(S): 5000 INJECTION INTRAVENOUS; SUBCUTANEOUS at 05:57

## 2021-11-11 NOTE — PACU DISCHARGE NOTE - COMMENTS
Met PACU criteria for transfer to [address] [place] [address], transferred via bed monitored, [facility]

## 2021-11-11 NOTE — PROGRESS NOTE ADULT - SUBJECTIVE AND OBJECTIVE BOX
Patient discussed on morning rounds with       Operation / Date: 11/10/21 right VATS with total decortication and evacuation of pleural effusion    SUBJECTIVE ASSESSMENT:  Pt is feeling well, no complaints. Eating/drinking well. Moving bowels well. Pain has been an issue for her. Denies any CP, palpitations, SOB, wheezing, abd pain, n/v/d/c, fevers or chills.    Vital Signs Last 24 Hrs  T(C): 36.7 (11 Nov 2021 08:42), Max: 36.7 (10 Nov 2021 23:48)  T(F): 98.1 (11 Nov 2021 08:42), Max: 98.1 (10 Nov 2021 23:48)  HR: 78 (11 Nov 2021 08:42) (62 - 110)  BP: 148/70 (11 Nov 2021 08:42) (117/56 - 148/70)  BP(mean): 86 (10 Nov 2021 23:00) (81 - 92)  RR: 18 (11 Nov 2021 08:42) (12 - 22)  SpO2: 95% (11 Nov 2021 08:42) (95% - 99%)  I&O's Detail    10 Nov 2021 07:01  -  11 Nov 2021 07:00  --------------------------------------------------------  IN:    IV PiggyBack: 50 mL    Lactated Ringers: 725 mL    Oral Fluid: 650 mL  Total IN: 1425 mL    OUT:    Chest Tube (mL): 15 mL    Chest Tube (mL): 160 mL    Voided (mL): 700 mL  Total OUT: 875 mL    Total NET: 550 mL    CHEST TUBE: yes - x2 CTs on suction, no air leak -- 1 to be removed today   REBECCA DRAIN:  no  EPICARDIAL WIRES: no  TIE DOWNS: no  ESPINO: no    PHYSICAL EXAM:  General: well appearing resting in bed in NAD   Neurological: AOx3. Motor skills grossly intact  Cardiovascular: Normal S1/S2. Regular rate/rhythm. No murmurs  Respiratory: Lungs CTA bilaterally. No wheezing or rales  Gastrointestinal: +BS in all 4 quadrants. Non-distended. Soft. Non-tender  Extremities: Strength 5/5 b/l upper/lower extremities. Sensation grossly intact upper/lower extremities. No edema. No calf tenderness.  Vascular: Radial 2+bilaterally, DP 2+ b/l  Incision Sites: right VATS incisions without erythema, purulence or ecchymosis.     LABS:                        12.5   11.80 )-----------( 294      ( 11 Nov 2021 08:43 )             39.4     COUMADIN:  no    PT/INR - ( 11 Nov 2021 08:43 )   PT: 11.7 sec;   INR: 0.97     PTT - ( 11 Nov 2021 08:43 )  PTT:28.2 sec    11-11    141  |  102  |  10  ----------------------------<  104<H>  4.1   |  26  |  0.70    Ca    9.4      11 Nov 2021 08:43    MEDICATIONS  (STANDING):  atorvastatin 20 milliGRAM(s) Oral at bedtime  BUpivacaine liposome 1.3% Injectable (no eMAR) 20 milliLiter(s) Local Injection once  ceFAZolin   IVPB 2000 milliGRAM(s) IV Intermittent every 8 hours  famotidine Injectable 20 milliGRAM(s) IV Push daily  glucagon  Injectable 1 milliGRAM(s) IntraMuscular once  heparin   Injectable 7500 Unit(s) SubCutaneous every 8 hours  influenza   Vaccine 0.5 milliLiter(s) IntraMuscular once  insulin lispro (ADMELOG) corrective regimen sliding scale   SubCutaneous Before meals and at bedtime  lactated ringers. 1000 milliLiter(s) (50 mL/Hr) IV Continuous <Continuous>  levothyroxine 25 MICROGram(s) Oral daily  lidocaine   4% Patch 1 Patch Transdermal daily    MEDICATIONS  (PRN):    RADIOLOGY & ADDITIONAL TESTS:  < from: Xray Chest 1 View- PORTABLE-Urgent (11.10.21 @ 17:04) >  IMPRESSION: Smallright-sided pleural effusion. Cardiomegaly.  < end of copied text >

## 2021-11-11 NOTE — CHART NOTE - NSCHARTNOTEFT_GEN_A_CORE
CT Removal:    Pt seen and examined at bedside. Case discussed with Dr. Sears and is recommending removal of CT.  Minimal output from CT.  No air leak appreciated.  CT removed without incident. Occlusive dressing placed. Follow up CXR with no obvious PTX noted.  Pt tolerated procedure well and remained hemodynamically stable.

## 2021-11-12 PROBLEM — Z87.09 PERSONAL HISTORY OF OTHER DISEASES OF THE RESPIRATORY SYSTEM: Chronic | Status: ACTIVE | Noted: 2021-11-09

## 2021-11-12 LAB
ANION GAP SERPL CALC-SCNC: 11 MMOL/L — SIGNIFICANT CHANGE UP (ref 5–17)
BUN SERPL-MCNC: 15 MG/DL — SIGNIFICANT CHANGE UP (ref 7–23)
CALCIUM SERPL-MCNC: 9.1 MG/DL — SIGNIFICANT CHANGE UP (ref 8.4–10.5)
CHLORIDE SERPL-SCNC: 102 MMOL/L — SIGNIFICANT CHANGE UP (ref 96–108)
CO2 SERPL-SCNC: 26 MMOL/L — SIGNIFICANT CHANGE UP (ref 22–31)
CREAT SERPL-MCNC: 0.72 MG/DL — SIGNIFICANT CHANGE UP (ref 0.5–1.3)
GLUCOSE SERPL-MCNC: 99 MG/DL — SIGNIFICANT CHANGE UP (ref 70–99)
HCT VFR BLD CALC: 40.7 % — SIGNIFICANT CHANGE UP (ref 34.5–45)
HGB BLD-MCNC: 13.1 G/DL — SIGNIFICANT CHANGE UP (ref 11.5–15.5)
MAGNESIUM SERPL-MCNC: 2.1 MG/DL — SIGNIFICANT CHANGE UP (ref 1.6–2.6)
MCHC RBC-ENTMCNC: 28.4 PG — SIGNIFICANT CHANGE UP (ref 27–34)
MCHC RBC-ENTMCNC: 32.2 GM/DL — SIGNIFICANT CHANGE UP (ref 32–36)
MCV RBC AUTO: 88.3 FL — SIGNIFICANT CHANGE UP (ref 80–100)
NON-GYNECOLOGICAL CYTOLOGY STUDY: SIGNIFICANT CHANGE UP
NRBC # BLD: 0 /100 WBCS — SIGNIFICANT CHANGE UP (ref 0–0)
PLATELET # BLD AUTO: 242 K/UL — SIGNIFICANT CHANGE UP (ref 150–400)
POTASSIUM SERPL-MCNC: 4.1 MMOL/L — SIGNIFICANT CHANGE UP (ref 3.5–5.3)
POTASSIUM SERPL-SCNC: 4.1 MMOL/L — SIGNIFICANT CHANGE UP (ref 3.5–5.3)
RBC # BLD: 4.61 M/UL — SIGNIFICANT CHANGE UP (ref 3.8–5.2)
RBC # FLD: 12.6 % — SIGNIFICANT CHANGE UP (ref 10.3–14.5)
SODIUM SERPL-SCNC: 139 MMOL/L — SIGNIFICANT CHANGE UP (ref 135–145)
WBC # BLD: 8.76 K/UL — SIGNIFICANT CHANGE UP (ref 3.8–10.5)
WBC # FLD AUTO: 8.76 K/UL — SIGNIFICANT CHANGE UP (ref 3.8–10.5)

## 2021-11-12 PROCEDURE — 71045 X-RAY EXAM CHEST 1 VIEW: CPT | Mod: 26

## 2021-11-12 RX ORDER — ACETAMINOPHEN 500 MG
650 TABLET ORAL EVERY 6 HOURS
Refills: 0 | Status: DISCONTINUED | OUTPATIENT
Start: 2021-11-12 | End: 2021-11-13

## 2021-11-12 RX ORDER — ACETAMINOPHEN 500 MG
1000 TABLET ORAL ONCE
Refills: 0 | Status: COMPLETED | OUTPATIENT
Start: 2021-11-12 | End: 2021-11-12

## 2021-11-12 RX ORDER — SODIUM CHLORIDE 9 MG/ML
1000 INJECTION, SOLUTION INTRAVENOUS
Refills: 0 | Status: DISCONTINUED | OUTPATIENT
Start: 2021-11-12 | End: 2021-11-13

## 2021-11-12 RX ADMIN — Medication 400 MILLIGRAM(S): at 02:04

## 2021-11-12 RX ADMIN — ATORVASTATIN CALCIUM 20 MILLIGRAM(S): 80 TABLET, FILM COATED ORAL at 22:02

## 2021-11-12 RX ADMIN — FAMOTIDINE 20 MILLIGRAM(S): 10 INJECTION INTRAVENOUS at 13:45

## 2021-11-12 RX ADMIN — HEPARIN SODIUM 7500 UNIT(S): 5000 INJECTION INTRAVENOUS; SUBCUTANEOUS at 22:03

## 2021-11-12 RX ADMIN — Medication 25 MICROGRAM(S): at 06:25

## 2021-11-12 RX ADMIN — LIDOCAINE 1 PATCH: 4 CREAM TOPICAL at 00:29

## 2021-11-12 RX ADMIN — HEPARIN SODIUM 7500 UNIT(S): 5000 INJECTION INTRAVENOUS; SUBCUTANEOUS at 06:24

## 2021-11-12 RX ADMIN — SODIUM CHLORIDE 100 MILLILITER(S): 9 INJECTION, SOLUTION INTRAVENOUS at 16:40

## 2021-11-12 RX ADMIN — Medication 400 MILLIGRAM(S): at 15:50

## 2021-11-12 RX ADMIN — HEPARIN SODIUM 7500 UNIT(S): 5000 INJECTION INTRAVENOUS; SUBCUTANEOUS at 13:45

## 2021-11-12 RX ADMIN — Medication 1000 MILLIGRAM(S): at 02:30

## 2021-11-12 NOTE — PROGRESS NOTE ADULT - SUBJECTIVE AND OBJECTIVE BOX
Patient discussed on morning rounds with Dr. Sears, Dr. King    Operation / Date: 11/10 R VATS, total decortication and evacuation of pleural effusion    SUBJECTIVE ASSESSMENT:  46y Female assessed at bedside this morning. Patient is emotional at her loss of independence while in the hospital. Denies acute pain. Does note she got dizzy when she stood up out of bed. Denies fever, chills, nausea, vomiting.     Vital Signs Last 24 Hrs  T(C): 37.2 (12 Nov 2021 14:33), Max: 37.2 (12 Nov 2021 14:33)  T(F): 98.9 (12 Nov 2021 14:33), Max: 98.9 (12 Nov 2021 14:33)  HR: 87 (12 Nov 2021 14:33) (69 - 87)  BP: 150/64 (12 Nov 2021 14:33) (111/72 - 150/64)  BP(mean): --  RR: 16 (12 Nov 2021 14:33) (16 - 18)  SpO2: 93% (12 Nov 2021 14:33) (92% - 97%)  I&O's Detail    11 Nov 2021 07:01  -  12 Nov 2021 07:00  --------------------------------------------------------  IN:    IV PiggyBack: 100 mL    Lactated Ringers: 350 mL  Total IN: 450 mL    OUT:    Chest Tube (mL): 5 mL    Chest Tube (mL): 100 mL    Voided (mL): 200 mL  Total OUT: 305 mL    Total NET: 145 mL    CHEST TUBE:  Yes. AIR LEAKS: No. Suction  REBECCA DRAIN:  No.  EPICARDIAL WIRES: No.  TIE DOWNS: Yes  ESPINO: No.    Physical Exam  CONSTITUTIONAL: Well appearing in NAD assessed laying comfortably in bed   NEURO: A&OX3. No focal deficits noted, moving bilateral upper and lower extremities                    CV: RRR, no murmurs, rubs, gallops  RESPIRATORY: Clear to auscultation bilateral posterior lung fields, no wheezes, rales, rhonchi   GI: +BS, NT/ND  MUSKULOSKELETAL: No peripheral edema or calf tenderness. Full strength and ROM bilateral upper and lower extremities   VASCULAR: Bilateral distal pulses 2+  INCISIONS: None    LABS:                        13.1   8.76  )-----------( 242      ( 12 Nov 2021 08:04 )             40.7       COUMADIN:  Yes/No. REASON: .    PT/INR - ( 11 Nov 2021 08:43 )   PT: 11.7 sec;   INR: 0.97          PTT - ( 11 Nov 2021 08:43 )  PTT:28.2 sec    11-12    139  |  102  |  15  ----------------------------<  99  4.1   |  26  |  0.72    Ca    9.1      12 Nov 2021 08:04  Mg     2.1     11-12            MEDICATIONS  (STANDING):  acetaminophen   IVPB .. 1000 milliGRAM(s) IV Intermittent once  atorvastatin 20 milliGRAM(s) Oral at bedtime  famotidine    Tablet 20 milliGRAM(s) Oral daily  heparin   Injectable 7500 Unit(s) SubCutaneous every 8 hours  influenza   Vaccine 0.5 milliLiter(s) IntraMuscular once  levothyroxine 25 MICROGram(s) Oral daily  lidocaine   4% Patch 1 Patch Transdermal daily  senna 2 Tablet(s) Oral at bedtime  sodium chloride 0.45%. 1000 milliLiter(s) (100 mL/Hr) IV Continuous <Continuous>    MEDICATIONS  (PRN):        RADIOLOGY & ADDITIONAL TESTS:     Patient discussed on morning rounds with Dr. Sears, Dr. King    Operation / Date: 11/10 R VATS, total decortication and evacuation of pleural effusion    SUBJECTIVE ASSESSMENT:  46y Female assessed at bedside this morning. Patient is emotional at her loss of independence while in the hospital. Denies acute pain. Does note she got dizzy when she stood up out of bed. Denies fever, chills, nausea, vomiting.     Vital Signs Last 24 Hrs  T(C): 37.2 (12 Nov 2021 14:33), Max: 37.2 (12 Nov 2021 14:33)  T(F): 98.9 (12 Nov 2021 14:33), Max: 98.9 (12 Nov 2021 14:33)  HR: 87 (12 Nov 2021 14:33) (69 - 87)  BP: 150/64 (12 Nov 2021 14:33) (111/72 - 150/64)  BP(mean): --  RR: 16 (12 Nov 2021 14:33) (16 - 18)  SpO2: 93% (12 Nov 2021 14:33) (92% - 97%)  I&O's Detail    11 Nov 2021 07:01  -  12 Nov 2021 07:00  --------------------------------------------------------  IN:    IV PiggyBack: 100 mL    Lactated Ringers: 350 mL  Total IN: 450 mL    OUT:    Chest Tube (mL): 5 mL    Chest Tube (mL): 100 mL    Voided (mL): 200 mL  Total OUT: 305 mL    Total NET: 145 mL    CHEST TUBE:  Yes. AIR LEAKS: No. Suction  REBECCA DRAIN:  No.  EPICARDIAL WIRES: No.  TIE DOWNS: Yes  ESPINO: No.    Physical Exam  CONSTITUTIONAL: Well appearing in NAD assessed laying comfortably in bed   NEURO: A&OX3. No focal deficits noted, moving bilateral upper and lower extremities                    CV: RRR, no murmurs, rubs, gallops  RESPIRATORY: Clear to auscultation bilateral posterior lung fields, no wheezes, rales, rhonchi   GI: +BS, NT/ND  MUSKULOSKELETAL: No peripheral edema or calf tenderness. Full strength and ROM bilateral upper and lower extremities   VASCULAR: Bilateral distal pulses 2+  INCISIONS: R VATS incisions clean, dry, intact     LABS:                        13.1   8.76  )-----------( 242      ( 12 Nov 2021 08:04 )             40.7       COUMADIN:  No    PT/INR - ( 11 Nov 2021 08:43 )   PT: 11.7 sec;   INR: 0.97          PTT - ( 11 Nov 2021 08:43 )  PTT:28.2 sec    11-12    139  |  102  |  15  ----------------------------<  99  4.1   |  26  |  0.72    Ca    9.1      12 Nov 2021 08:04  Mg     2.1     11-12    MEDICATIONS  (STANDING):  acetaminophen   IVPB .. 1000 milliGRAM(s) IV Intermittent once  atorvastatin 20 milliGRAM(s) Oral at bedtime  famotidine    Tablet 20 milliGRAM(s) Oral daily  heparin   Injectable 7500 Unit(s) SubCutaneous every 8 hours  influenza   Vaccine 0.5 milliLiter(s) IntraMuscular once  levothyroxine 25 MICROGram(s) Oral daily  lidocaine   4% Patch 1 Patch Transdermal daily  senna 2 Tablet(s) Oral at bedtime  sodium chloride 0.45%. 1000 milliLiter(s) (100 mL/Hr) IV Continuous <Continuous>    MEDICATIONS  (PRN):        RADIOLOGY & ADDITIONAL TESTS:    < from: Xray Chest 1 View- PORTABLE-Routine (Xray Chest 1 View- PORTABLE-Routine in AM.) (11.12.21 @ 05:47) >  Findings/  impression:Right apical tiny pneumothorax. Stable positioning right chest tube. Stable cardiomegaly bilateral opacities/I basilar focal atelectasis and bony structures.    < end of copied text >

## 2021-11-13 ENCOUNTER — TRANSCRIPTION ENCOUNTER (OUTPATIENT)
Age: 46
End: 2021-11-13

## 2021-11-13 VITALS
TEMPERATURE: 98 F | HEART RATE: 93 BPM | OXYGEN SATURATION: 94 % | RESPIRATION RATE: 17 BRPM | DIASTOLIC BLOOD PRESSURE: 66 MMHG | SYSTOLIC BLOOD PRESSURE: 95 MMHG

## 2021-11-13 LAB
ANION GAP SERPL CALC-SCNC: 8 MMOL/L — SIGNIFICANT CHANGE UP (ref 5–17)
BUN SERPL-MCNC: 11 MG/DL — SIGNIFICANT CHANGE UP (ref 7–23)
CALCIUM SERPL-MCNC: 9.1 MG/DL — SIGNIFICANT CHANGE UP (ref 8.4–10.5)
CHLORIDE SERPL-SCNC: 103 MMOL/L — SIGNIFICANT CHANGE UP (ref 96–108)
CO2 SERPL-SCNC: 26 MMOL/L — SIGNIFICANT CHANGE UP (ref 22–31)
CREAT SERPL-MCNC: 0.65 MG/DL — SIGNIFICANT CHANGE UP (ref 0.5–1.3)
GLUCOSE SERPL-MCNC: 110 MG/DL — HIGH (ref 70–99)
HCT VFR BLD CALC: 38 % — SIGNIFICANT CHANGE UP (ref 34.5–45)
HGB BLD-MCNC: 12.2 G/DL — SIGNIFICANT CHANGE UP (ref 11.5–15.5)
MAGNESIUM SERPL-MCNC: 2 MG/DL — SIGNIFICANT CHANGE UP (ref 1.6–2.6)
MCHC RBC-ENTMCNC: 28.4 PG — SIGNIFICANT CHANGE UP (ref 27–34)
MCHC RBC-ENTMCNC: 32.1 GM/DL — SIGNIFICANT CHANGE UP (ref 32–36)
MCV RBC AUTO: 88.6 FL — SIGNIFICANT CHANGE UP (ref 80–100)
NRBC # BLD: 0 /100 WBCS — SIGNIFICANT CHANGE UP (ref 0–0)
PLATELET # BLD AUTO: 232 K/UL — SIGNIFICANT CHANGE UP (ref 150–400)
POTASSIUM SERPL-MCNC: 4 MMOL/L — SIGNIFICANT CHANGE UP (ref 3.5–5.3)
POTASSIUM SERPL-SCNC: 4 MMOL/L — SIGNIFICANT CHANGE UP (ref 3.5–5.3)
RBC # BLD: 4.29 M/UL — SIGNIFICANT CHANGE UP (ref 3.8–5.2)
RBC # FLD: 12.4 % — SIGNIFICANT CHANGE UP (ref 10.3–14.5)
SODIUM SERPL-SCNC: 137 MMOL/L — SIGNIFICANT CHANGE UP (ref 135–145)
WBC # BLD: 8.15 K/UL — SIGNIFICANT CHANGE UP (ref 3.8–10.5)
WBC # FLD AUTO: 8.15 K/UL — SIGNIFICANT CHANGE UP (ref 3.8–10.5)

## 2021-11-13 PROCEDURE — 71045 X-RAY EXAM CHEST 1 VIEW: CPT | Mod: 26

## 2021-11-13 RX ORDER — FENTANYL CITRATE 50 UG/ML
12.5 INJECTION INTRAVENOUS ONCE
Refills: 0 | Status: DISCONTINUED | OUTPATIENT
Start: 2021-11-13 | End: 2021-11-13

## 2021-11-13 RX ORDER — ATORVASTATIN CALCIUM 80 MG/1
1 TABLET, FILM COATED ORAL
Qty: 0 | Refills: 0 | DISCHARGE

## 2021-11-13 RX ORDER — LEVOTHYROXINE SODIUM 125 MCG
1 TABLET ORAL
Qty: 0 | Refills: 0 | DISCHARGE

## 2021-11-13 RX ORDER — ACETAMINOPHEN 500 MG
2 TABLET ORAL
Qty: 240 | Refills: 0
Start: 2021-11-13 | End: 2021-12-12

## 2021-11-13 RX ORDER — FAMOTIDINE 10 MG/ML
1 INJECTION INTRAVENOUS
Qty: 30 | Refills: 0
Start: 2021-11-13 | End: 2021-12-12

## 2021-11-13 RX ADMIN — Medication 650 MILLIGRAM(S): at 12:38

## 2021-11-13 RX ADMIN — LIDOCAINE 1 PATCH: 4 CREAM TOPICAL at 11:38

## 2021-11-13 RX ADMIN — HEPARIN SODIUM 7500 UNIT(S): 5000 INJECTION INTRAVENOUS; SUBCUTANEOUS at 07:16

## 2021-11-13 RX ADMIN — Medication 650 MILLIGRAM(S): at 11:38

## 2021-11-13 RX ADMIN — Medication 25 MICROGRAM(S): at 07:16

## 2021-11-13 RX ADMIN — FAMOTIDINE 20 MILLIGRAM(S): 10 INJECTION INTRAVENOUS at 11:38

## 2021-11-13 NOTE — PROGRESS NOTE ADULT - SUBJECTIVE AND OBJECTIVE BOX
Patient discussed on morning rounds with Dr. Sears     Operation / Date: 11/10 R VATS, total decortication and evacuation of pleural effusion    Surgeon: Renu    Referring Physician: Dr. Taylor Wells      SUBJECTIVE ASSESSMENT:  46y Female seen and examined at bedside.  Patient complaining of pain from chest tube.  Denies chest pain, shortness of breath, nausea, vomiting.    Hospital Course:  47 y/o M, former smoker, with a PMHx of hypothyroidism, HLD, positive DEREK, CHEVY, with complaints of right neck pain and underwnet subsequent CT chest revealing right pleural effusion. Pt is now s/p right thoracentesis with transudate effusion on 7/4/21 at [facility]. She was referred to Dr. Sears for further evaluation by Dr. Wells and was deemed a good surgical candidate.  On 11/10/21 pt underwent right VATs with total decortication and evacuation of pleural effusion. OR course uncomplicated. Arrived to the PACU with 2 pleural CTs in place, on suction, no air leak, minimal output. POD1 one chest tube was removed with stable follow up CXR. POD2 remaining chest tube clamped to monitor for re-accumulation. POD 3 chest tube and xray with minimal reaccumulation of fluid.  CT removed. Post removal xray with no pneumothorax.  Patient ambulating independently with room air, tolerating diet, pain controlled, urinating and having bowel movements.  Patient stable for discharge home per Dr. Sears.     Vital Signs Last 24 Hrs  T(C): 36.9 (13 Nov 2021 09:26), Max: 37.2 (12 Nov 2021 14:33)  T(F): 98.4 (13 Nov 2021 09:26), Max: 98.9 (12 Nov 2021 14:33)  HR: 90 (13 Nov 2021 10:03) (81 - 92)  BP: 124/53 (13 Nov 2021 10:03) (102/67 - 150/64)  BP(mean): --  RR: 18 (13 Nov 2021 10:03) (16 - 18)  SpO2: 94% (13 Nov 2021 10:03) (93% - 97%)  I&O's Detail    12 Nov 2021 07:01  -  13 Nov 2021 07:00  --------------------------------------------------------  IN:    sodium chloride 0.45%: 400 mL  Total IN: 400 mL    OUT:    Chest Tube (mL): 40 mL  Total OUT: 40 mL    Total NET: 360 mL      13 Nov 2021 07:01  -  13 Nov 2021 11:11  --------------------------------------------------------  IN:    Oral Fluid: 240 mL    sodium chloride 0.45%: 200 mL  Total IN: 440 mL    OUT:    Voided (mL): 500 mL  Total OUT: 500 mL    Total NET: -60 mL          TIE DOWNS REMOVED: No.    PHYSICAL EXAM:    CONSTITUTIONAL: Well appearing in NAD assessed laying comfortably in bed   NEURO: A&OX3. No focal deficits noted, moving bilateral upper and lower extremities                    CV: RRR, no murmurs, rubs, gallops  RESPIRATORY: Clear to auscultation bilateral posterior lung fields, no wheezes, rales, rhonchi   GI: +BS, NT/ND  MUSKULOSKELETAL: No peripheral edema or calf tenderness. Full strength and ROM bilateral upper and lower extremities   VASCULAR: Bilateral distal pulses 2+  INCISIONS: R VATS incisions clean, dry, intact     Incision Sites:    LABS:                        12.2   8.15  )-----------( 232      ( 13 Nov 2021 06:53 )             38.0       COUMADIN:  No.        DOSE:                  INDICATION:                GOAL INR:        11-13    137  |  103  |  11  ----------------------------<  110<H>  4.0   |  26  |  0.65    Ca    9.1      13 Nov 2021 06:53  Mg     2.0     11-13            MEDICATIONS  (STANDING):  atorvastatin 20 milliGRAM(s) Oral at bedtime  famotidine    Tablet 20 milliGRAM(s) Oral daily  heparin   Injectable 7500 Unit(s) SubCutaneous every 8 hours  influenza   Vaccine 0.5 milliLiter(s) IntraMuscular once  levothyroxine 25 MICROGram(s) Oral daily  lidocaine   4% Patch 1 Patch Transdermal daily  senna 2 Tablet(s) Oral at bedtime  sodium chloride 0.45%. 1000 milliLiter(s) (100 mL/Hr) IV Continuous <Continuous>      Discharge CXR:  < from: Xray Chest 1 View-PORTABLE IMMEDIATE (Xray Chest 1 View-PORTABLE IMMEDIATE .) (11.13.21 @ 10:24) >  Frontal examination of the chest demonstrates no interval change lung pathology in comparison to prior examination of the chest 11/13/2021. Status post removal right chest tube.    < end of copied text >

## 2021-11-13 NOTE — CHART NOTE - NSCHARTNOTEFT_GEN_A_CORE
Patient seen and examined at bedside.  Case discussed with Dr. Sears. Minimal output from CT.  No air leak appreciated.  Per Dr. Sears's request, CT removed and tie down secured without incident.  Occlusive DSD placed.  Patient tolerated procedure well.  Chest Xray pending.

## 2021-11-13 NOTE — DISCHARGE NOTE PROVIDER - NSDCMRMEDTOKEN_GEN_ALL_CORE_FT
acetaminophen 325 mg oral tablet: 2 tab(s) orally every 6 hours, As needed, Moderate Pain (4 - 6)  atorvastatin 20 mg oral tablet: 1 tab(s) orally once a day  famotidine 20 mg oral tablet: 1 tab(s) orally once a day  Tirosint 25 mcg (0.025 mg) oral capsule: 1 cap(s) orally once a day

## 2021-11-13 NOTE — DISCHARGE NOTE PROVIDER - NSDCFUSCHEDAPPT_GEN_ALL_CORE_FT
LELSEY PATTERSON ; 11/18/2021 ; [facility] [address]  LESLEY PATTERSON ; 11/30/2021 ; [facility] [address] [place] [address]

## 2021-11-13 NOTE — DISCHARGE NOTE PROVIDER - HOSPITAL COURSE
45 y/o M, former smoker, with a PMHx of hypothyroidism, HLD, positive DEREK, CHEVY, with complaints of right neck pain and underwnet subsequent CT chest revealing right pleural effusion. Pt is now s/p right thoracentesis with transudate effusion on 7/4/21 at [facility]. She was referred to Dr. Sears for further evaluation by Dr. Wells and was deemed a good surgical candidate.  On 11/10/21 pt underwent right VATs with total decortication and evacuation of pleural effusion. OR course uncomplicated. Arrived to the PACU with 2 pleural CTs in place, on suction, no air leak, minimal output. POD1 one chest tube was removed with stable follow up CXR. POD2 remaining chest tube clamped to monitor for re-accumulation. POD 3 chest tube and xray with minimal reaccumulation of fluid.  CT removed. Post removal xray with no pneumothorax.  Patient ambulating independently with room air, tolerating diet, pain controlled, urinating and having bowel movements.  Patient stable for discharge home per Dr. Sears.     Over 35 minutes was spent with the patient reviewing the discharge material including medications, follow up appointments, recovery, concerning symptoms, and how to contact their health care providers if they have questions

## 2021-11-13 NOTE — PROGRESS NOTE ADULT - ASSESSMENT
47 y/o M, former smoker, with a PMHx of hypothyroidism, HLD, positive DEREK, CHEVY, with complaints of right neck pain and underwnet subsequent CT chest revealing right pleural effusion. Pt is now s/p right thoracentesis with transudate effusion on 7/4/21 at [facility]. Presented today for further evaluation and intervention after being referred by Dr. Wells. On 11/10/21 pt underwent right VATs with total decortication and evacuation of pleural effusion. OR course uncomplicated. Arrived to the PACU with 2 pleural CTs in place, on suction, no air leak, minimal output. POD1 pending 1 CT removal, the other CT will remain on suction.     Plan:  Neurovascular:   -Pain well controlled with current regimen. PRN's: acetaminophen     Cardiovascular:   -HLD: continue with atorvastatin 20mg daily   -Hemodynamically stable.   -Monitor: BP, HR, tele    Respiratory:   -POD1 from 11/10/21 right VATS with total decortication and evacuation of pleural effusion    -2 CTs on suction, on air leak, minimal output    -pending removal of 1 CT today   -Oxygenating well on room air  -Encourage continued use of IS 10x/hr and frequent ambulation  -CXR: no acute pathology, no PTX     GI:  -GI PPX: famotidine 20mg daily   -PO Diet: DASH/TLC   -Bowel Regimen: none needed at this time     Renal / :  -Continue to monitor renal function: BUN/Cr 10/0.7  -Monitor I/O's daily     Endocrine:    -Hypothyroidism: continue with levothyroxine 25mcg daily   -No hx of DM or thyroid dx    Hematologic:  -CBC: H/H- 12/39  -Coagulation Panel.    ID:  -Temperature: afebrile   -CBC: WBC- pending post-op labs   -Continue to observe for SIRS/Sepsis Syndrome.    Prophylaxis:  -DVT prophylaxis with 5000 SubQ Heparin q8h.  -Continue with SCD's b/l while patient is at rest     Disposition:  -chest tube management     
47 y/o M, former smoker, with a PMHx of hypothyroidism, HLD, positive DEREK, CHEVY, with complaints of right neck pain and underwnet subsequent CT chest revealing right pleural effusion. Pt is now s/p right thoracentesis with transudate effusion on 7/4/21 at [facility]. Presented today for further evaluation and intervention after being referred by Dr. Wells. On 11/10/21 pt underwent right VATs with total decortication and evacuation of pleural effusion. OR course uncomplicated. Arrived to the PACU with 2 pleural CTs in place, on suction, no air leak, minimal output.     Plan:    Neurovascular:   -Pain well controlled with current regimen. PRN's: acetaminophen     Cardiovascular:   -HLD: continue with atorvastatin 20mg daily   -Hemodynamically stable.   -Monitor: BP, HR, tele    Respiratory:   -POD0 from 11/10/21 right VATs with total decortication and evacuation of pleural effusion    -2 CTs on suction, on air leak, minimal output  -Oxygenating well on room air  -Encourage continued use of IS 10x/hr and frequent ambulation  -CXR: no acute pathology, no PTX     GI:  -GI PPX:  -PO Diet  -Bowel Regimen:     Renal / :  -Continue to monitor renal function: BUN/Cr  -Monitor I/O's daily     Endocrine:    -Hypothyroidism: continue with levothyroxine 25mcg daily   -No hx of DM or thyroid dx    Hematologic:  -CBC: H/H- pending post-op labs   -Coagulation Panel.    ID:  -Temperature: afebrile   -CBC: WBC- pending post-op labs   -Continue to observe for SIRS/Sepsis Syndrome.    Prophylaxis:  -DVT prophylaxis with 5000 SubQ Heparin q8h.  -Continue with SCD's b/l while patient is at rest     Disposition:  -chest tube management     
47 y/o M, former smoker, with a PMHx of hypothyroidism, HLD, positive DEREK, CHEVY, with complaints of right neck pain and underwnet subsequent CT chest revealing right pleural effusion. Pt is now s/p right thoracentesis with transudate effusion on 7/4/21 at [facility]. She was referred to Dr. Sears for further evaluation by Dr. Wells and was deemed a good surgical candidate.  On 11/10/21 pt underwent right VATs with total decortication and evacuation of pleural effusion. OR course uncomplicated. Arrived to the PACU with 2 pleural CTs in place, on suction, no air leak, minimal output. POD1 one chest tube was removed with stable follow up CXR. POD2 remaining chest tube clamped to monitor for re-accumulation. POD 3 chest tube and xray with minimal reaccumulation of fluid.  CT removed. Post removal xray with no pneumothorax.  Patient ambulating independently with room air, tolerating diet, pain controlled, urinating and having bowel movements.  Patient stable for discharge home per Dr. Sears. 
A/P:    47 y/o M, former smoker, with a PMHx of hypothyroidism, HLD, positive DEREK, CHEVY, with complaints of right neck pain and underwnet subsequent CT chest revealing right pleural effusion. Pt is now s/p right thoracentesis with transudate effusion on 7/4/21 at [facility]. She was referred to Dr. Sears for further evaluation by Dr. Wells and was deemed a good surgical candidate.  On 11/10/21 pt underwent right VATs with total decortication and evacuation of pleural effusion. OR course uncomplicated. Arrived to the PACU with 2 pleural CTs in place, on suction, no air leak, minimal output. POD1 one chest tube was removed with stable follow up CXR. POD2 remaining chest tube clamped to monitor for re-accumulation.     Neurovascular:   - No delirium. Pain well controlled with current regimen.  -Continue tylenol PRN    Cardiovascular:   -Hemodynamically stable. HR controlled (69-87)  - BP controlled (113//70)  - HLD: continue atorvastatin 20 daily  - Symptomatically orthostatic: Continue IV fluids     Respiratory:   - POD2 s/p R VATS, RA total decortication and evacuation of pleural effusion  - 1 remaining CT, clamed, f/u CXR in AM and plan to remove tomorrow   - 02 Sat = 98% on 2LNC  -If on oxygen wean to RA from for O2 Sat > 93%.  -Encourage C+DB and Use of IS 10x / hr while awake.  -CXR stable, no PTX, no effusions     GI:   - Stable.  -Continue GI PPX with pepcid  -PO Diet.    Renal / :  - BUN/Cr stable at 11/0.72  -Continue to monitor renal function.  -Monitor I/O's.  - Replete electrolytes PRN    Endocrine:    -A1c 5.3, no known hx DM  -TSH pending, hx hypothyroidism, continue synthroid 25 mcg daily     Hematologic:  -H/H stable at 13.1/40.7 with no obvious signs of bleeding  -Coagulation Panel.    ID:  -Pt remains afebrile with no elevation in WBC or signs of infection  -Continue to monitor CBC  -Observe for SIRS/Sepsis Syndrome.    Prophylaxis:  -DVT prophylaxis with 7500 SubQ Heparin q8h.  -SCD's    Disposition:  -Home when medically appropriate pending CT management

## 2021-11-13 NOTE — DISCHARGE NOTE PROVIDER - NSDCCPTREATMENT_GEN_ALL_CORE_FT
PRINCIPAL PROCEDURE  Procedure: Robot-assisted pleurodesis using da Josefina Xi  Findings and Treatment:

## 2021-11-13 NOTE — DISCHARGE NOTE PROVIDER - NSDCFUADDINST_GEN_ALL_CORE_FT
-Walk daily as tolerated and use your incentive spirometer every hour.    -No driving or strenuous activity/exercise for 6 weeks, or until cleared by your surgeon.    -Gently clean your incisions with anti-bacterial soap and water, pat dry.  You may leave them open to air.    -Call your doctor if you have shortness of breath, chest pain not relieved by pain medication, dizziness, fever >101.5, or increased redness or drainage from incisions.    -You can are going home with stitches at your drain site.  They are covered with a clean dressing.  You can shower with the dressing, pat everything dry.  If the dressing falls off, please cover with a bandaid.

## 2021-11-13 NOTE — DISCHARGE NOTE NURSING/CASE MANAGEMENT/SOCIAL WORK - NSDCFUADDAPPT_GEN_ALL_CORE_FT
-Please follow up with Dr. King on 11/18/21 @9:15am.  The office is located at [facility], [facility], 4th floor. Call us with any questions #[phone].    -Follow up with Dr. Wells on 11/30/21 @2pm.

## 2021-11-13 NOTE — DISCHARGE NOTE PROVIDER - NSDCCPCAREPLAN_GEN_ALL_CORE_FT
PRINCIPAL DISCHARGE DIAGNOSIS  Diagnosis: Pleural effusion, right  Assessment and Plan of Treatment:       SECONDARY DISCHARGE DIAGNOSES  Diagnosis: HLD (hyperlipidemia)  Assessment and Plan of Treatment:     Diagnosis: CHEVY (obstructive sleep apnea)  Assessment and Plan of Treatment:     Diagnosis: Hypothyroid  Assessment and Plan of Treatment:

## 2021-11-13 NOTE — DISCHARGE NOTE PROVIDER - CARE PROVIDER_API CALL
Dennis King)  Surgery  [address] [address]  Phone: [phone]  Fax: [phone]  Scheduled Appointment: 11/18/2021 09:15 AM    Taylor Wells  [address] [place] [address]  Phone: [phone]  Fax: (   )    -  Scheduled Appointment: 11/30/2021 12:00 AM

## 2021-11-13 NOTE — DISCHARGE NOTE NURSING/CASE MANAGEMENT/SOCIAL WORK - PATIENT PORTAL LINK FT
You can access the FollowMyHealth Patient Portal offered by [facility] by registering at the following website: [email or web address]. By joining [facility]’s FollowMyHealth portal, you will also be able to view your health information using other applications (apps) compatible with our system.

## 2021-11-13 NOTE — DISCHARGE NOTE PROVIDER - NSDCFUADDAPPT_GEN_ALL_CORE_FT
-Please follow up with Dr. King on 11/18/21 @9:15am.  The office is located at [facility], [facility], 4th floor. Call us with any questions #[phone].    -Follow up with Dr. Wells on 11/30/21 @2pm.

## 2021-11-13 NOTE — DISCHARGE NOTE PROVIDER - NSDCACTIVITY_GEN_ALL_CORE
Return to Work/School allowed/Do not drive or operate machinery/Showering allowed/Stairs allowed/Walking - Indoors allowed/No heavy lifting/straining/Walking - Outdoors allowed/Follow Instructions Provided by your Surgical Team

## 2021-11-15 LAB
CULTURE RESULTS: NO GROWTH — SIGNIFICANT CHANGE UP
CULTURE RESULTS: NO GROWTH — SIGNIFICANT CHANGE UP
SPECIMEN SOURCE: SIGNIFICANT CHANGE UP
SPECIMEN SOURCE: SIGNIFICANT CHANGE UP

## 2021-11-16 LAB — SURGICAL PATHOLOGY STUDY: SIGNIFICANT CHANGE UP

## 2021-11-17 DIAGNOSIS — E03.9 HYPOTHYROIDISM, UNSPECIFIED: ICD-10-CM

## 2021-11-17 DIAGNOSIS — J98.19 OTHER PULMONARY COLLAPSE: ICD-10-CM

## 2021-11-17 DIAGNOSIS — G47.33 OBSTRUCTIVE SLEEP APNEA (ADULT) (PEDIATRIC): ICD-10-CM

## 2021-11-17 DIAGNOSIS — R06.02 SHORTNESS OF BREATH: ICD-10-CM

## 2021-11-17 DIAGNOSIS — E78.5 HYPERLIPIDEMIA, UNSPECIFIED: ICD-10-CM

## 2021-11-17 DIAGNOSIS — Z87.891 PERSONAL HISTORY OF NICOTINE DEPENDENCE: ICD-10-CM

## 2021-11-17 DIAGNOSIS — J98.11 ATELECTASIS: ICD-10-CM

## 2021-11-17 DIAGNOSIS — J90 PLEURAL EFFUSION, NOT ELSEWHERE CLASSIFIED: ICD-10-CM

## 2021-11-17 DIAGNOSIS — E66.9 OBESITY, UNSPECIFIED: ICD-10-CM

## 2021-11-18 ENCOUNTER — OUTPATIENT (OUTPATIENT)
Dept: OUTPATIENT SERVICES | Facility: HOSPITAL | Age: 46
LOS: 1 days | End: 2021-11-18
Payer: COMMERCIAL

## 2021-11-18 ENCOUNTER — APPOINTMENT (OUTPATIENT)
Dept: THORACIC SURGERY | Facility: CLINIC | Age: 46
End: 2021-11-18
Payer: COMMERCIAL

## 2021-11-18 VITALS
BODY MASS INDEX: 35.68 KG/M2 | OXYGEN SATURATION: 95 % | HEIGHT: 66 IN | WEIGHT: 222 LBS | TEMPERATURE: 98.1 F | HEART RATE: 75 BPM | RESPIRATION RATE: 18 BRPM | DIASTOLIC BLOOD PRESSURE: 56 MMHG | SYSTOLIC BLOOD PRESSURE: 118 MMHG

## 2021-11-18 DIAGNOSIS — M51.26 OTHER INTERVERTEBRAL DISC DISPLACEMENT, LUMBAR REGION: Chronic | ICD-10-CM

## 2021-11-18 DIAGNOSIS — Z98.890 OTHER SPECIFIED POSTPROCEDURAL STATES: Chronic | ICD-10-CM

## 2021-11-18 DIAGNOSIS — Z90.49 ACQUIRED ABSENCE OF OTHER SPECIFIED PARTS OF DIGESTIVE TRACT: Chronic | ICD-10-CM

## 2021-11-18 DIAGNOSIS — Z90.89 ACQUIRED ABSENCE OF OTHER ORGANS: Chronic | ICD-10-CM

## 2021-11-18 PROCEDURE — 71046 X-RAY EXAM CHEST 2 VIEWS: CPT | Mod: 26

## 2021-11-18 PROCEDURE — 71046 X-RAY EXAM CHEST 2 VIEWS: CPT

## 2021-11-18 PROCEDURE — 99024 POSTOP FOLLOW-UP VISIT: CPT

## 2021-11-22 LAB
VIRUS SPEC CULT: SIGNIFICANT CHANGE UP
VIRUS SPEC CULT: SIGNIFICANT CHANGE UP

## 2021-11-30 ENCOUNTER — APPOINTMENT (OUTPATIENT)
Dept: PULMONOLOGY | Facility: CLINIC | Age: 46
End: 2021-11-30
Payer: COMMERCIAL

## 2021-11-30 VITALS
RESPIRATION RATE: 16 BRPM | WEIGHT: 230 LBS | OXYGEN SATURATION: 95 % | DIASTOLIC BLOOD PRESSURE: 69 MMHG | HEART RATE: 79 BPM | BODY MASS INDEX: 36.96 KG/M2 | HEIGHT: 66 IN | SYSTOLIC BLOOD PRESSURE: 112 MMHG | TEMPERATURE: 98.2 F

## 2021-11-30 DIAGNOSIS — R76.8 OTHER SPECIFIED ABNORMAL IMMUNOLOGICAL FINDINGS IN SERUM: ICD-10-CM

## 2021-11-30 PROCEDURE — 99214 OFFICE O/P EST MOD 30 MIN: CPT

## 2021-11-30 NOTE — ASSESSMENT
[FreeTextEntry1] : In summary the patient is a 46-year-old obese female with obstructive sleep apnea and recurrent pleural effusion who is status post chest tube drainage and presents for follow-up.  I reviewed the patient's films with her and her son.  The patient does have mild fluid in the right lower lobe.  The patient is scheduled to follow-up with thoracic surgery.  She may require repeat VATS and possible pleurodesis.  The patient is started on ibuprofen and instructed to follow-up in 1 month

## 2021-11-30 NOTE — HISTORY OF PRESENT ILLNESS
[Never] : never [TextBox_4] : Patient is a 46-year-old obese female with past medical history significant for obstructive sleep apnea, positive DEREK, high cholesterol who was found to have a recurrent pleural effusion and underwent thoracentesis.  Pathology has been noted as negative.  Patient's fluid consistent with exudative effusion.  She presents today complaining of occasional pleuritic discomfort.  She currently denies fevers chills weight loss or hemoptysis

## 2021-11-30 NOTE — REASON FOR VISIT
[Follow-Up] : a follow-up visit [Abnormal CXR/ Chest CT] : an abnormal CXR/ chest CT [Sleep Apnea] : sleep apnea [Cough] : cough [Family Member] : family member

## 2021-12-06 PROCEDURE — 89051 BODY FLUID CELL COUNT: CPT

## 2021-12-06 PROCEDURE — 86850 RBC ANTIBODY SCREEN: CPT

## 2021-12-06 PROCEDURE — 85027 COMPLETE CBC AUTOMATED: CPT

## 2021-12-06 PROCEDURE — 87252 VIRUS INOCULATION TISSUE: CPT

## 2021-12-06 PROCEDURE — 82962 GLUCOSE BLOOD TEST: CPT

## 2021-12-06 PROCEDURE — 83036 HEMOGLOBIN GLYCOSYLATED A1C: CPT

## 2021-12-06 PROCEDURE — 85610 PROTHROMBIN TIME: CPT

## 2021-12-06 PROCEDURE — 85730 THROMBOPLASTIN TIME PARTIAL: CPT

## 2021-12-06 PROCEDURE — 88305 TISSUE EXAM BY PATHOLOGIST: CPT

## 2021-12-06 PROCEDURE — 82945 GLUCOSE OTHER FLUID: CPT

## 2021-12-06 PROCEDURE — 36415 COLL VENOUS BLD VENIPUNCTURE: CPT

## 2021-12-06 PROCEDURE — 86901 BLOOD TYPING SEROLOGIC RH(D): CPT

## 2021-12-06 PROCEDURE — 86900 BLOOD TYPING SEROLOGIC ABO: CPT

## 2021-12-06 PROCEDURE — 87075 CULTR BACTERIA EXCEPT BLOOD: CPT

## 2021-12-06 PROCEDURE — 85025 COMPLETE CBC W/AUTO DIFF WBC: CPT

## 2021-12-06 PROCEDURE — 87102 FUNGUS ISOLATION CULTURE: CPT

## 2021-12-06 PROCEDURE — 83735 ASSAY OF MAGNESIUM: CPT

## 2021-12-06 PROCEDURE — S2900: CPT

## 2021-12-06 PROCEDURE — 84157 ASSAY OF PROTEIN OTHER: CPT

## 2021-12-06 PROCEDURE — 83615 LACTATE (LD) (LDH) ENZYME: CPT

## 2021-12-06 PROCEDURE — C9399: CPT

## 2021-12-06 PROCEDURE — 87070 CULTURE OTHR SPECIMN AEROBIC: CPT

## 2021-12-06 PROCEDURE — 87116 MYCOBACTERIA CULTURE: CPT

## 2021-12-06 PROCEDURE — 80048 BASIC METABOLIC PNL TOTAL CA: CPT

## 2021-12-06 PROCEDURE — 87015 SPECIMEN INFECT AGNT CONCNTJ: CPT

## 2021-12-06 PROCEDURE — 87205 SMEAR GRAM STAIN: CPT

## 2021-12-06 PROCEDURE — 88112 CYTOPATH CELL ENHANCE TECH: CPT

## 2021-12-06 PROCEDURE — 83986 ASSAY PH BODY FLUID NOS: CPT

## 2021-12-06 PROCEDURE — 87206 SMEAR FLUORESCENT/ACID STAI: CPT

## 2021-12-06 PROCEDURE — 84315 BODY FLUID SPECIFIC GRAVITY: CPT

## 2021-12-06 PROCEDURE — C1889: CPT

## 2021-12-06 PROCEDURE — 71045 X-RAY EXAM CHEST 1 VIEW: CPT

## 2021-12-09 ENCOUNTER — OUTPATIENT (OUTPATIENT)
Dept: OUTPATIENT SERVICES | Facility: HOSPITAL | Age: 46
LOS: 1 days | End: 2021-12-09
Payer: COMMERCIAL

## 2021-12-09 ENCOUNTER — APPOINTMENT (OUTPATIENT)
Dept: THORACIC SURGERY | Facility: CLINIC | Age: 46
End: 2021-12-09
Payer: COMMERCIAL

## 2021-12-09 VITALS
OXYGEN SATURATION: 94 % | BODY MASS INDEX: 37.28 KG/M2 | TEMPERATURE: 97.5 F | SYSTOLIC BLOOD PRESSURE: 127 MMHG | HEART RATE: 79 BPM | DIASTOLIC BLOOD PRESSURE: 58 MMHG | HEIGHT: 66 IN | RESPIRATION RATE: 17 BRPM | WEIGHT: 232 LBS

## 2021-12-09 DIAGNOSIS — Z90.89 ACQUIRED ABSENCE OF OTHER ORGANS: Chronic | ICD-10-CM

## 2021-12-09 DIAGNOSIS — M51.26 OTHER INTERVERTEBRAL DISC DISPLACEMENT, LUMBAR REGION: Chronic | ICD-10-CM

## 2021-12-09 DIAGNOSIS — Z90.49 ACQUIRED ABSENCE OF OTHER SPECIFIED PARTS OF DIGESTIVE TRACT: Chronic | ICD-10-CM

## 2021-12-09 DIAGNOSIS — Z09 ENCOUNTER FOR FOLLOW-UP EXAMINATION AFTER COMPLETED TREATMENT FOR CONDITIONS OTHER THAN MALIGNANT NEOPLASM: ICD-10-CM

## 2021-12-09 DIAGNOSIS — Z98.890 OTHER SPECIFIED POSTPROCEDURAL STATES: Chronic | ICD-10-CM

## 2021-12-09 PROCEDURE — 99024 POSTOP FOLLOW-UP VISIT: CPT

## 2021-12-09 PROCEDURE — 71046 X-RAY EXAM CHEST 2 VIEWS: CPT | Mod: 26

## 2021-12-09 PROCEDURE — 71046 X-RAY EXAM CHEST 2 VIEWS: CPT

## 2021-12-11 LAB
CULTURE RESULTS: SIGNIFICANT CHANGE UP
CULTURE RESULTS: SIGNIFICANT CHANGE UP
SPECIMEN SOURCE: SIGNIFICANT CHANGE UP
SPECIMEN SOURCE: SIGNIFICANT CHANGE UP

## 2021-12-23 ENCOUNTER — OUTPATIENT (OUTPATIENT)
Dept: OUTPATIENT SERVICES | Facility: HOSPITAL | Age: 46
LOS: 1 days | End: 2021-12-23
Payer: COMMERCIAL

## 2021-12-23 ENCOUNTER — APPOINTMENT (OUTPATIENT)
Dept: THORACIC SURGERY | Facility: CLINIC | Age: 46
End: 2021-12-23
Payer: COMMERCIAL

## 2021-12-23 VITALS
HEIGHT: 66 IN | SYSTOLIC BLOOD PRESSURE: 110 MMHG | BODY MASS INDEX: 37.12 KG/M2 | DIASTOLIC BLOOD PRESSURE: 55 MMHG | TEMPERATURE: 97.4 F | OXYGEN SATURATION: 99 % | RESPIRATION RATE: 17 BRPM | HEART RATE: 68 BPM | WEIGHT: 231 LBS

## 2021-12-23 DIAGNOSIS — Z90.89 ACQUIRED ABSENCE OF OTHER ORGANS: Chronic | ICD-10-CM

## 2021-12-23 DIAGNOSIS — M51.26 OTHER INTERVERTEBRAL DISC DISPLACEMENT, LUMBAR REGION: Chronic | ICD-10-CM

## 2021-12-23 DIAGNOSIS — Z98.890 OTHER SPECIFIED POSTPROCEDURAL STATES: Chronic | ICD-10-CM

## 2021-12-23 DIAGNOSIS — Z90.49 ACQUIRED ABSENCE OF OTHER SPECIFIED PARTS OF DIGESTIVE TRACT: Chronic | ICD-10-CM

## 2021-12-23 PROCEDURE — 99024 POSTOP FOLLOW-UP VISIT: CPT

## 2021-12-23 PROCEDURE — 71046 X-RAY EXAM CHEST 2 VIEWS: CPT | Mod: 26

## 2021-12-23 PROCEDURE — 71046 X-RAY EXAM CHEST 2 VIEWS: CPT

## 2021-12-30 ENCOUNTER — APPOINTMENT (OUTPATIENT)
Dept: PULMONOLOGY | Facility: CLINIC | Age: 46
End: 2021-12-30

## 2022-01-11 ENCOUNTER — APPOINTMENT (OUTPATIENT)
Dept: PULMONOLOGY | Facility: CLINIC | Age: 47
End: 2022-01-11
Payer: COMMERCIAL

## 2022-01-11 VITALS
HEART RATE: 90 BPM | TEMPERATURE: 97.5 F | HEIGHT: 66 IN | OXYGEN SATURATION: 98 % | SYSTOLIC BLOOD PRESSURE: 105 MMHG | DIASTOLIC BLOOD PRESSURE: 68 MMHG | RESPIRATION RATE: 16 BRPM | BODY MASS INDEX: 36.96 KG/M2 | WEIGHT: 230 LBS

## 2022-01-11 DIAGNOSIS — G47.33 OBSTRUCTIVE SLEEP APNEA (ADULT) (PEDIATRIC): ICD-10-CM

## 2022-01-11 DIAGNOSIS — J90 PLEURAL EFFUSION, NOT ELSEWHERE CLASSIFIED: ICD-10-CM

## 2022-01-11 DIAGNOSIS — Z78.9 OTHER SPECIFIED HEALTH STATUS: ICD-10-CM

## 2022-01-11 DIAGNOSIS — G47.8 OTHER SLEEP DISORDERS: ICD-10-CM

## 2022-01-11 DIAGNOSIS — Z86.69 PERSONAL HISTORY OF OTHER DISEASES OF THE NERVOUS SYSTEM AND SENSE ORGANS: ICD-10-CM

## 2022-01-11 PROCEDURE — 99214 OFFICE O/P EST MOD 30 MIN: CPT

## 2022-01-12 LAB
COVID-19 NUCLEOCAPSID  GAM ANTIBODY INTERPRETATION: POSITIVE
COVID-19 SPIKE DOMAIN ANTIBODY INTERPRETATION: NEGATIVE
SARS-COV-2 AB SERPL IA-ACNC: 0.63 U/ML
SARS-COV-2 AB SERPL QL IA: 7.04 INDEX

## 2022-01-13 PROBLEM — Z78.9 SOCIAL ALCOHOL USE: Status: ACTIVE | Noted: 2021-07-13

## 2022-01-13 PROBLEM — Z86.69 HISTORY OF OBSTRUCTIVE SLEEP APNEA: Status: RESOLVED | Noted: 2021-10-20 | Resolved: 2022-01-13

## 2022-01-13 NOTE — ASSESSMENT
[FreeTextEntry1] : In summary the patient is a 46-year-old obese female with past medical history significant for recurrent nonmalignant  pleural effusion status post VATS and decortication, obstructive sleep apnea currently on CPAP, status post COVID-19 infection who presents for follow-up.  The patient's physical exam is significant for good air entry bilaterally.  I had a lengthy discussion with the patient regarding therapy going forward and if she really accumulates fluid once again.  I have discussed the case with thoracic surgery Dr. King and the patient will require VATS and pleural biopsy if she reaccumulate's.  The patient is instructed to continue current therapy and follow-up in 2 months

## 2022-01-13 NOTE — HISTORY OF PRESENT ILLNESS
[Never] : never [TextBox_4] : Patient is a 46-year-old obese female with past medical history significant for obstructive sleep apnea currently on CPAP, history of left pleural effusion status post right VATS and decortication with negative pathology who presents for follow-up.  Patient is concerned regarding reaccumulation.  She complains of occasional cough who was recently found to be COVID-positive who presents today for follow-up.  The patient denies fevers chills chest pain weight loss or hemoptysis

## 2022-01-25 ENCOUNTER — APPOINTMENT (OUTPATIENT)
Dept: PULMONOLOGY | Facility: CLINIC | Age: 47
End: 2022-01-25

## 2022-03-25 NOTE — PATIENT PROFILE ADULT - LONGEST PERIOD TOBACCO-FREE
[FreeTextEntry1] : 1) Hypothyroidism:\par Appears clinically euthyroid at this time on 50 mcg of LT4 (taking med appropriately). Reassess TFTs and need for medication titration at this time. 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 check HPO axis and PRL, no need for EE at this time, although if hypoestrogenic, may consider for bone health. Estrogen adequate as of 1/2022, cont MPG for scheduled menses q3 months.\par \par hypovitamonosis\par given past anti seizure meds, monitor for hypovitaminosis and osteomalacia  [Levothyroxine] : The patient was instructed to take Levothyroxine on an empty stomach, separate from vitamins, and wait at least 30 minutes before eating 4 mos

## 2024-12-05 ENCOUNTER — APPOINTMENT (OUTPATIENT)
Age: 49
End: 2024-12-05
Payer: COMMERCIAL

## 2024-12-05 ENCOUNTER — LABORATORY RESULT (OUTPATIENT)
Age: 49
End: 2024-12-05

## 2024-12-05 VITALS
HEIGHT: 66 IN | HEART RATE: 103 BPM | BODY MASS INDEX: 35.36 KG/M2 | TEMPERATURE: 98.2 F | SYSTOLIC BLOOD PRESSURE: 105 MMHG | OXYGEN SATURATION: 97 % | DIASTOLIC BLOOD PRESSURE: 71 MMHG | WEIGHT: 220 LBS | RESPIRATION RATE: 14 BRPM

## 2024-12-05 DIAGNOSIS — Z00.00 ENCOUNTER FOR GENERAL ADULT MEDICAL EXAMINATION W/OUT ABNORMAL FINDINGS: ICD-10-CM

## 2024-12-05 DIAGNOSIS — Z12.39 ENCOUNTER FOR OTHER SCREENING FOR MALIGNANT NEOPLASM OF BREAST: ICD-10-CM

## 2024-12-05 DIAGNOSIS — Z12.11 ENCOUNTER FOR SCREENING FOR MALIGNANT NEOPLASM OF COLON: ICD-10-CM

## 2024-12-05 DIAGNOSIS — Z13.31 ENCOUNTER FOR SCREENING FOR DEPRESSION: ICD-10-CM

## 2024-12-05 LAB
ALBUMIN SERPL ELPH-MCNC: 4 G/DL
ALP BLD-CCNC: 92 U/L
ALT SERPL-CCNC: 39 U/L
ANION GAP SERPL CALC-SCNC: 11 MMOL/L
AST SERPL-CCNC: 25 U/L
BASOPHILS # BLD AUTO: 0.05 K/UL
BASOPHILS NFR BLD AUTO: 1.2 %
BILIRUB SERPL-MCNC: 0.5 MG/DL
BUN SERPL-MCNC: 10 MG/DL
CALCIUM SERPL-MCNC: 8.6 MG/DL
CHLORIDE SERPL-SCNC: 105 MMOL/L
CHOLEST SERPL-MCNC: 141 MG/DL
CO2 SERPL-SCNC: 23 MMOL/L
CREAT SERPL-MCNC: 0.77 MG/DL
EGFR: 93 ML/MIN/1.73M2
EOSINOPHIL # BLD AUTO: 0.11 K/UL
EOSINOPHIL NFR BLD AUTO: 2.7 %
ESTIMATED AVERAGE GLUCOSE: 91 MG/DL
GLUCOSE SERPL-MCNC: 94 MG/DL
HBA1C MFR BLD HPLC: 4.8 %
HCT VFR BLD CALC: 38.4 %
HDLC SERPL-MCNC: 42 MG/DL
HGB BLD-MCNC: 12.9 G/DL
IMM GRANULOCYTES NFR BLD AUTO: 0.7 %
LDLC SERPL CALC-MCNC: 76 MG/DL
LYMPHOCYTES # BLD AUTO: 0.67 K/UL
LYMPHOCYTES NFR BLD AUTO: 16.6 %
MAN DIFF?: NORMAL
MCHC RBC-ENTMCNC: 29.2 PG
MCHC RBC-ENTMCNC: 33.6 G/DL
MCV RBC AUTO: 86.9 FL
MONOCYTES # BLD AUTO: 0.43 K/UL
MONOCYTES NFR BLD AUTO: 10.7 %
NEUTROPHILS # BLD AUTO: 2.74 K/UL
NEUTROPHILS NFR BLD AUTO: 68.1 %
NONHDLC SERPL-MCNC: 99 MG/DL
PLATELET # BLD AUTO: 204 K/UL
POTASSIUM SERPL-SCNC: 4.3 MMOL/L
PROT SERPL-MCNC: 6.4 G/DL
RBC # BLD: 4.42 M/UL
RBC # FLD: 13.2 %
SODIUM SERPL-SCNC: 140 MMOL/L
TRIGL SERPL-MCNC: 135 MG/DL
TSH SERPL-ACNC: 5.83 UIU/ML
WBC # FLD AUTO: 4.03 K/UL

## 2024-12-05 PROCEDURE — 99386 PREV VISIT NEW AGE 40-64: CPT

## 2024-12-05 PROCEDURE — 36415 COLL VENOUS BLD VENIPUNCTURE: CPT

## 2024-12-05 PROCEDURE — G0447 BEHAVIOR COUNSEL OBESITY 15M: CPT | Mod: 59

## 2024-12-05 PROCEDURE — G0444 DEPRESSION SCREEN ANNUAL: CPT | Mod: 59

## 2024-12-05 RX ORDER — TIRZEPATIDE 15 MG/.5ML
15 INJECTION, SOLUTION SUBCUTANEOUS
Qty: 2 | Refills: 2 | Status: ACTIVE | COMMUNITY
Start: 2024-12-05

## 2025-01-07 DIAGNOSIS — E03.9 HYPOTHYROIDISM, UNSPECIFIED: ICD-10-CM

## 2025-01-07 RX ORDER — LEVOTHYROXINE SODIUM 0.03 MG/1
25 TABLET ORAL
Qty: 90 | Refills: 1 | Status: ACTIVE | COMMUNITY
Start: 2025-01-07 | End: 1900-01-01

## 2025-04-12 ENCOUNTER — NON-APPOINTMENT (OUTPATIENT)
Age: 50
End: 2025-04-12